# Patient Record
Sex: FEMALE | Race: WHITE | URBAN - METROPOLITAN AREA
[De-identification: names, ages, dates, MRNs, and addresses within clinical notes are randomized per-mention and may not be internally consistent; named-entity substitution may affect disease eponyms.]

---

## 2018-06-05 ENCOUNTER — OUTPATIENT (OUTPATIENT)
Dept: INPATIENT UNIT | Facility: HOSPITAL | Age: 38
LOS: 1 days | Discharge: HOME | End: 2018-06-05
Payer: COMMERCIAL

## 2018-06-05 PROCEDURE — 59025 FETAL NON-STRESS TEST: CPT | Mod: 26

## 2018-06-11 DIAGNOSIS — O47.9 FALSE LABOR, UNSPECIFIED: ICD-10-CM

## 2020-05-14 ENCOUNTER — INPATIENT (INPATIENT)
Facility: HOSPITAL | Age: 40
LOS: 3 days | Discharge: HOME | DRG: 897 | End: 2020-05-18
Attending: FAMILY MEDICINE | Admitting: INTERNAL MEDICINE
Payer: COMMERCIAL

## 2020-05-14 VITALS
HEART RATE: 84 BPM | TEMPERATURE: 99 F | WEIGHT: 139.99 LBS | SYSTOLIC BLOOD PRESSURE: 146 MMHG | RESPIRATION RATE: 19 BRPM | OXYGEN SATURATION: 95 % | HEIGHT: 65 IN | DIASTOLIC BLOOD PRESSURE: 93 MMHG

## 2020-05-14 DIAGNOSIS — Z98.891 HISTORY OF UTERINE SCAR FROM PREVIOUS SURGERY: Chronic | ICD-10-CM

## 2020-05-14 DIAGNOSIS — F10.239 ALCOHOL DEPENDENCE WITH WITHDRAWAL, UNSPECIFIED: ICD-10-CM

## 2020-05-14 LAB
ALBUMIN SERPL ELPH-MCNC: 4.4 G/DL — SIGNIFICANT CHANGE UP (ref 3.3–5)
ALP SERPL-CCNC: 52 U/L — SIGNIFICANT CHANGE UP (ref 40–120)
ALT FLD-CCNC: 227 U/L — HIGH (ref 12–78)
ANION GAP SERPL CALC-SCNC: 10 MMOL/L — SIGNIFICANT CHANGE UP (ref 5–17)
AST SERPL-CCNC: 252 U/L — HIGH (ref 15–37)
BILIRUB SERPL-MCNC: 0.8 MG/DL — SIGNIFICANT CHANGE UP (ref 0.2–1.2)
BUN SERPL-MCNC: 12 MG/DL — SIGNIFICANT CHANGE UP (ref 7–23)
CALCIUM SERPL-MCNC: 8.8 MG/DL — SIGNIFICANT CHANGE UP (ref 8.5–10.1)
CHLORIDE SERPL-SCNC: 103 MMOL/L — SIGNIFICANT CHANGE UP (ref 96–108)
CO2 SERPL-SCNC: 24 MMOL/L — SIGNIFICANT CHANGE UP (ref 22–31)
CREAT SERPL-MCNC: 0.73 MG/DL — SIGNIFICANT CHANGE UP (ref 0.5–1.3)
ETHANOL SERPL-MCNC: <10 MG/DL — SIGNIFICANT CHANGE UP (ref 0–10)
GLUCOSE SERPL-MCNC: 162 MG/DL — HIGH (ref 70–99)
HCT VFR BLD CALC: 36.4 % — SIGNIFICANT CHANGE UP (ref 34.5–45)
HGB BLD-MCNC: 12.6 G/DL — SIGNIFICANT CHANGE UP (ref 11.5–15.5)
MAGNESIUM SERPL-MCNC: 1.7 MG/DL — SIGNIFICANT CHANGE UP (ref 1.6–2.6)
MCHC RBC-ENTMCNC: 34.6 GM/DL — SIGNIFICANT CHANGE UP (ref 32–36)
MCHC RBC-ENTMCNC: 34.8 PG — HIGH (ref 27–34)
MCV RBC AUTO: 100.6 FL — HIGH (ref 80–100)
PLATELET # BLD AUTO: 99 K/UL — LOW (ref 150–400)
POTASSIUM SERPL-MCNC: 3.9 MMOL/L — SIGNIFICANT CHANGE UP (ref 3.5–5.3)
POTASSIUM SERPL-SCNC: 3.9 MMOL/L — SIGNIFICANT CHANGE UP (ref 3.5–5.3)
PROT SERPL-MCNC: 7.8 GM/DL — SIGNIFICANT CHANGE UP (ref 6–8.3)
RBC # BLD: 3.62 M/UL — LOW (ref 3.8–5.2)
RBC # FLD: 12.4 % — SIGNIFICANT CHANGE UP (ref 10.3–14.5)
SARS-COV-2 RNA SPEC QL NAA+PROBE: SIGNIFICANT CHANGE UP
SODIUM SERPL-SCNC: 137 MMOL/L — SIGNIFICANT CHANGE UP (ref 135–145)
TROPONIN I SERPL-MCNC: <0.015 NG/ML — SIGNIFICANT CHANGE UP (ref 0.01–0.04)
TROPONIN I SERPL-MCNC: <0.015 NG/ML — SIGNIFICANT CHANGE UP (ref 0.01–0.04)
WBC # BLD: 3.83 K/UL — SIGNIFICANT CHANGE UP (ref 3.8–10.5)
WBC # FLD AUTO: 3.83 K/UL — SIGNIFICANT CHANGE UP (ref 3.8–10.5)

## 2020-05-14 PROCEDURE — 70450 CT HEAD/BRAIN W/O DYE: CPT | Mod: 26

## 2020-05-14 PROCEDURE — 83735 ASSAY OF MAGNESIUM: CPT

## 2020-05-14 PROCEDURE — 71045 X-RAY EXAM CHEST 1 VIEW: CPT | Mod: 26

## 2020-05-14 PROCEDURE — 85027 COMPLETE CBC AUTOMATED: CPT

## 2020-05-14 PROCEDURE — 85025 COMPLETE CBC W/AUTO DIFF WBC: CPT

## 2020-05-14 PROCEDURE — 80076 HEPATIC FUNCTION PANEL: CPT

## 2020-05-14 PROCEDURE — 93010 ELECTROCARDIOGRAM REPORT: CPT

## 2020-05-14 PROCEDURE — 87635 SARS-COV-2 COVID-19 AMP PRB: CPT

## 2020-05-14 PROCEDURE — 84100 ASSAY OF PHOSPHORUS: CPT

## 2020-05-14 PROCEDURE — 99223 1ST HOSP IP/OBS HIGH 75: CPT

## 2020-05-14 PROCEDURE — 80048 BASIC METABOLIC PNL TOTAL CA: CPT

## 2020-05-14 PROCEDURE — 36415 COLL VENOUS BLD VENIPUNCTURE: CPT

## 2020-05-14 RX ORDER — FOLIC ACID 0.8 MG
1 TABLET ORAL DAILY
Refills: 0 | Status: DISCONTINUED | OUTPATIENT
Start: 2020-05-14 | End: 2020-05-18

## 2020-05-14 RX ORDER — SODIUM CHLORIDE 9 MG/ML
1000 INJECTION, SOLUTION INTRAVENOUS
Refills: 0 | Status: DISCONTINUED | OUTPATIENT
Start: 2020-05-14 | End: 2020-05-17

## 2020-05-14 RX ORDER — SODIUM CHLORIDE 9 MG/ML
1000 INJECTION INTRAMUSCULAR; INTRAVENOUS; SUBCUTANEOUS ONCE
Refills: 0 | Status: COMPLETED | OUTPATIENT
Start: 2020-05-14 | End: 2020-05-14

## 2020-05-14 RX ORDER — THIAMINE MONONITRATE (VIT B1) 100 MG
100 TABLET ORAL DAILY
Refills: 0 | Status: COMPLETED | OUTPATIENT
Start: 2020-05-14 | End: 2020-05-17

## 2020-05-14 RX ORDER — FLUOXETINE HCL 10 MG
40 CAPSULE ORAL DAILY
Refills: 0 | Status: DISCONTINUED | OUTPATIENT
Start: 2020-05-14 | End: 2020-05-18

## 2020-05-14 RX ADMIN — SODIUM CHLORIDE 1000 MILLILITER(S): 9 INJECTION INTRAMUSCULAR; INTRAVENOUS; SUBCUTANEOUS at 12:32

## 2020-05-14 RX ADMIN — Medication 2 MILLIGRAM(S): at 19:58

## 2020-05-14 RX ADMIN — Medication 2 MILLIGRAM(S): at 22:13

## 2020-05-14 RX ADMIN — Medication 2 MILLIGRAM(S): at 13:19

## 2020-05-14 RX ADMIN — SODIUM CHLORIDE 1000 MILLILITER(S): 9 INJECTION INTRAMUSCULAR; INTRAVENOUS; SUBCUTANEOUS at 13:32

## 2020-05-14 NOTE — ED PROVIDER NOTE - CLINICAL SUMMARY MEDICAL DECISION MAKING FREE TEXT BOX
Possible alcohol withdrawal seizures vs. seizures from other causes. Patient admitted for further inpatient evaluation.

## 2020-05-14 NOTE — H&P ADULT - NSHPPHYSICALEXAM_GEN_ALL_CORE
Vital Signs Last 24 Hrs  T(C): 37.5 (14 May 2020 17:51), Max: 37.5 (14 May 2020 17:51)  T(F): 99.5 (14 May 2020 17:51), Max: 99.5 (14 May 2020 17:51)  HR: 89 (14 May 2020 17:51) (81 - 89)  BP: 126/85 (14 May 2020 17:51) (126/85 - 146/93)  RR: 18 (14 May 2020 17:51) (18 - 19)  SpO2: 98% (14 May 2020 17:51) (95% - 98%)

## 2020-05-14 NOTE — ED PROVIDER NOTE - OBJECTIVE STATEMENT
40 year old female with PMH of depression, anxiety, alcohol use, presents to the ED, for a witnessed seizure, with loc, lasting a few minutes, witnessed by  who contacted EMT, likely post ictal at the time of the presentation, at the time of evaluation in the ED, sober, back to baseline, no focal neurological complaints, no visual complaints, tolerating PO, no cp, sob or palpitation, no abdominal pain. No epistaxis. No melena or hematochezia. Prior to seizure did not consume any hard drugs. States she has been drinking a bit more due to the news and the pandemic and that she recently slowed or stopped a day or so prior to the ED evaluation. Patient otherwise has no complaints, including no thoracic, abdominal, hip or limb complaints. No recent trauma.

## 2020-05-14 NOTE — H&P ADULT - HISTORY OF PRESENT ILLNESS
39 y/o F PMHx significant for anxiety, and depression presents to  for further evaluation and management of witnessed seizure-like activity at home. The patient denies any prior history of seizures, denies any recent sick contacts, subjective fevers, headaches, weakness, or paresthesias. The episode was witnessed by her  at home who notified EMS. The patient was likely postictal upon arrival of EMS and was noted to have bit her tongue during the episode. BGM as per EMS 137mg/dL. The patient does admit to consuming increasingly more alcohol (up uo 1-2 bottles of wine/day at times) although she states that her last drink was at 11pm on 5/13. She admits to having increased consumption as she has spent more time at home "during these times." Labs => MCV/MCH => 100.6/34.8, PLT 99, Glu 162, AST/ALT, 252/227, TnI (-) x 2, Glu 183, EtOH (-), COVID-19 (-). CT Head => Unremarkable noncontrast head CT. CXR => Negative Portable Chest. In the ED the patient was given Lorazepam 2mg IVP x 1, and NS x 1.

## 2020-05-14 NOTE — ED PROVIDER NOTE - CARE PLAN
Principal Discharge DX:	Alcohol withdrawal syndrome with complication  Secondary Diagnosis:	Seizures

## 2020-05-14 NOTE — H&P ADULT - ASSESSMENT
39 y/o F PMHx significant for anxiety, and depression presents to  for further evaluation and management of witnessed seizure-like activity at home. The patient denies any prior history of seizures, denies any recent sick contacts, subjective fevers, headaches, weakness, or paresthesias. The episode was witnessed by her  at home who notified EMS. The patient was likely postictal upon arrival of EMS and was noted to have bit her tongue during the episode. BGM as per EMS 137mg/dL. The patient does admit to consuming increasingly more alcohol (up uo 1-2 bottles of wine/day at times) although she states that her last drink was at 11pm on 5/13. She admits to having increased consumption as she has spent more time at home "during these times." Labs => MCV/MCH => 100.6/34.8, PLT 99, Glu 162, AST/ALT, 252/227, TnI (-) x 2, Glu 183, EtOH (-), COVID-19 (-). CT Head => Unremarkable noncontrast head CT. CXR => Negative Portable Chest. In the ED the patient was given Lorazepam 2mg IVP x 1, and NS x 1.    #Acute Alcohol Withdrawal Seizures  ~admit to Medicine  ~cont. CIWA protocol  ~seizure precautions  ~fall precautions  ~bed alarm  ~f/u Psychiatry  ~f/u w/ SW in the am    #Anxiety/Depression  ~cont. Fluoxetine 40mg po daily  ~takes Lorazepam as per medication reconciliation    #Vte ppx  ~IMPROVE VTE Risk Score is 0  [  ] Previous VTE                                                  3  [  ] Thrombophilia                                               2  [  ] Lower limb paralysis                                      2        (unable to hold up >15 seconds)    [  ] Current Cancer                                              2         (within 6 months)  [  ] Immobilization > 24 hrs                                1  [  ] ICU/CCU stay > 24 hours                              1  [  ] Age > 60                                                      1  ~cont. SCDs for now

## 2020-05-14 NOTE — ED ADULT TRIAGE NOTE - CHIEF COMPLAINT QUOTE
Patient comes in s/p seizure. Patient has no hx of seizures. Seizure was witnessed by . Patient was post ictal on EMS arrival. BGM as per . Patient admits to drinking 1-2 bottles of wine. Patient with c/o biting her tongue and anxiety.

## 2020-05-15 DIAGNOSIS — F41.9 ANXIETY DISORDER, UNSPECIFIED: ICD-10-CM

## 2020-05-15 DIAGNOSIS — F10.10 ALCOHOL ABUSE, UNCOMPLICATED: ICD-10-CM

## 2020-05-15 LAB
ANION GAP SERPL CALC-SCNC: 10 MMOL/L — SIGNIFICANT CHANGE UP (ref 5–17)
BASOPHILS # BLD AUTO: 0.02 K/UL — SIGNIFICANT CHANGE UP (ref 0–0.2)
BASOPHILS NFR BLD AUTO: 0.6 % — SIGNIFICANT CHANGE UP (ref 0–2)
BUN SERPL-MCNC: 8 MG/DL — SIGNIFICANT CHANGE UP (ref 7–23)
CALCIUM SERPL-MCNC: 8.1 MG/DL — LOW (ref 8.5–10.1)
CHLORIDE SERPL-SCNC: 105 MMOL/L — SIGNIFICANT CHANGE UP (ref 96–108)
CO2 SERPL-SCNC: 24 MMOL/L — SIGNIFICANT CHANGE UP (ref 22–31)
CREAT SERPL-MCNC: 0.44 MG/DL — LOW (ref 0.5–1.3)
EOSINOPHIL # BLD AUTO: 0.08 K/UL — SIGNIFICANT CHANGE UP (ref 0–0.5)
EOSINOPHIL NFR BLD AUTO: 2.3 % — SIGNIFICANT CHANGE UP (ref 0–6)
GLUCOSE SERPL-MCNC: 63 MG/DL — LOW (ref 70–99)
HCT VFR BLD CALC: 33.7 % — LOW (ref 34.5–45)
HGB BLD-MCNC: 11.3 G/DL — LOW (ref 11.5–15.5)
IMM GRANULOCYTES NFR BLD AUTO: 0.3 % — SIGNIFICANT CHANGE UP (ref 0–1.5)
LYMPHOCYTES # BLD AUTO: 0.93 K/UL — LOW (ref 1–3.3)
LYMPHOCYTES # BLD AUTO: 26.2 % — SIGNIFICANT CHANGE UP (ref 13–44)
MAGNESIUM SERPL-MCNC: 2.1 MG/DL — SIGNIFICANT CHANGE UP (ref 1.6–2.6)
MCHC RBC-ENTMCNC: 33.5 GM/DL — SIGNIFICANT CHANGE UP (ref 32–36)
MCHC RBC-ENTMCNC: 34 PG — SIGNIFICANT CHANGE UP (ref 27–34)
MCV RBC AUTO: 101.5 FL — HIGH (ref 80–100)
MONOCYTES # BLD AUTO: 0.33 K/UL — SIGNIFICANT CHANGE UP (ref 0–0.9)
MONOCYTES NFR BLD AUTO: 9.3 % — SIGNIFICANT CHANGE UP (ref 2–14)
NEUTROPHILS # BLD AUTO: 2.18 K/UL — SIGNIFICANT CHANGE UP (ref 1.8–7.4)
NEUTROPHILS NFR BLD AUTO: 61.3 % — SIGNIFICANT CHANGE UP (ref 43–77)
PHOSPHATE SERPL-MCNC: 2.5 MG/DL — SIGNIFICANT CHANGE UP (ref 2.5–4.5)
PLATELET # BLD AUTO: 84 K/UL — LOW (ref 150–400)
POTASSIUM SERPL-MCNC: 3.2 MMOL/L — LOW (ref 3.5–5.3)
POTASSIUM SERPL-SCNC: 3.2 MMOL/L — LOW (ref 3.5–5.3)
RBC # BLD: 3.32 M/UL — LOW (ref 3.8–5.2)
RBC # FLD: 12.3 % — SIGNIFICANT CHANGE UP (ref 10.3–14.5)
SODIUM SERPL-SCNC: 139 MMOL/L — SIGNIFICANT CHANGE UP (ref 135–145)
WBC # BLD: 3.55 K/UL — LOW (ref 3.8–10.5)
WBC # FLD AUTO: 3.55 K/UL — LOW (ref 3.8–10.5)

## 2020-05-15 PROCEDURE — 99232 SBSQ HOSP IP/OBS MODERATE 35: CPT

## 2020-05-15 PROCEDURE — 99233 SBSQ HOSP IP/OBS HIGH 50: CPT

## 2020-05-15 RX ORDER — POTASSIUM CHLORIDE 20 MEQ
40 PACKET (EA) ORAL EVERY 4 HOURS
Refills: 0 | Status: COMPLETED | OUTPATIENT
Start: 2020-05-15 | End: 2020-05-15

## 2020-05-15 RX ADMIN — Medication 2 MILLIGRAM(S): at 02:14

## 2020-05-15 RX ADMIN — SODIUM CHLORIDE 100 MILLILITER(S): 9 INJECTION, SOLUTION INTRAVENOUS at 00:18

## 2020-05-15 RX ADMIN — Medication 2 MILLIGRAM(S): at 14:17

## 2020-05-15 RX ADMIN — Medication 2 MILLIGRAM(S): at 06:02

## 2020-05-15 RX ADMIN — Medication 40 MILLIEQUIVALENT(S): at 14:16

## 2020-05-15 RX ADMIN — Medication 1.5 MILLIGRAM(S): at 22:29

## 2020-05-15 RX ADMIN — Medication 40 MILLIEQUIVALENT(S): at 10:06

## 2020-05-15 RX ADMIN — Medication 40 MILLIGRAM(S): at 10:06

## 2020-05-15 RX ADMIN — SODIUM CHLORIDE 100 MILLILITER(S): 9 INJECTION, SOLUTION INTRAVENOUS at 22:39

## 2020-05-15 RX ADMIN — Medication 1.5 MILLIGRAM(S): at 18:13

## 2020-05-15 RX ADMIN — Medication 1 MILLIGRAM(S): at 10:06

## 2020-05-15 RX ADMIN — Medication 100 MILLIGRAM(S): at 10:06

## 2020-05-15 RX ADMIN — Medication 1 TABLET(S): at 10:05

## 2020-05-15 RX ADMIN — Medication 2 MILLIGRAM(S): at 10:06

## 2020-05-15 NOTE — PROGRESS NOTE ADULT - SUBJECTIVE AND OBJECTIVE BOX
HOSPITALIST PROGRESS NOTE:  SUBJECTIVE:  PCP:  Chief Complaint: Patient is a 40y old  Female who presents with a chief complaint of Seizures (14 May 2020 18:17)      HPI:  41 y/o F PMHx significant for anxiety, and depression presents to  for further evaluation and management of witnessed seizure-like activity at home. The patient denies any prior history of seizures, denies any recent sick contacts, subjective fevers, headaches, weakness, or paresthesias. The episode was witnessed by her  at home who notified EMS. The patient was likely postictal upon arrival of EMS and was noted to have bit her tongue during the episode. BGM as per EMS 137mg/dL. The patient does admit to consuming increasingly more alcohol (up uo 1-2 bottles of wine/day at times) although she states that her last drink was at 11pm on 5/13. She admits to having increased consumption as she has spent more time at home "during these times." Labs => MCV/MCH => 100.6/34.8, PLT 99, Glu 162, AST/ALT, 252/227, TnI (-) x 2, Glu 183, EtOH (-), COVID-19 (-). CT Head => Unremarkable noncontrast head CT. CXR => Negative Portable Chest. In the ED the patient was given Lorazepam 2mg IVP x 1, and NS x 1. (14 May 2020 18:17)    5/15: Above reviewed; patient has no complaints. no hx of seizures or alcohol related seizures       Allergies:  No Known Allergies    REVIEW OF SYSTEMS:  See HPI. All other review of systems is negative unless indicated above.     OBJECTIVE  Physical Exam:  Vital Signs Last 24 Hrs  T(C): 36.6 (15 May 2020 12:00), Max: 37.5 (14 May 2020 17:51)  T(F): 97.8 (15 May 2020 12:00), Max: 99.5 (14 May 2020 17:51)  HR: 64 (15 May 2020 12:00) (62 - 89)  BP: 137/81 (15 May 2020 12:00) (103/70 - 137/81)  BP(mean): --  RR: 16 (15 May 2020 12:00) (16 - 19)  SpO2: 99% (15 May 2020 12:00) (95% - 100%)      Constitutional: NAD, awake and alert, well-developed  Neurological: AAO x 3, no focal deficits  HEENT: PERRLA, EOMI, MMM  Neck: Soft and supple, No LAD, No JVD  Respiratory: Breath sounds are clear bilaterally, No wheezing, rales or rhonchi  Cardiovascular: S1 and S2, regular rate and rhythm; no Murmurs, gallops or rubs  Gastrointestinal: Bowel Sounds present, soft, nontender, nondistended, no guarding, no rebound tenderness  Back: No CVA tenderness   Extremities: No peripheral edema  Vascular: 2+ peripheral pulses  Musculoskeletal: 5/5 strength b/l upper and lower extremities  Skin: No rashes  Breast: Deferred  Rectal: Deferred    MEDICATIONS  (STANDING):  FLUoxetine 40 milliGRAM(s) Oral daily  folic acid 1 milliGRAM(s) Oral daily  LORazepam     Tablet   Oral   LORazepam     Tablet 2 milliGRAM(s) Oral every 4 hours  LORazepam     Tablet 1.5 milliGRAM(s) Oral every 4 hours  multivitamin 1 Tablet(s) Oral daily  potassium chloride    Tablet ER 40 milliEquivalent(s) Oral every 4 hours  sodium chloride 0.9% 1000 milliLiter(s) IV Continuous   thiamine 100 milliGRAM(s) Oral daily      LABS: All Labs Reviewed:                        11.3   3.55  )-----------( 84       ( 15 May 2020 07:28 )             33.7     05-15    139  |  105  |  8   ----------------------------<  63<L>  3.2<L>   |  24  |  0.44<L>    Ca    8.1<L>      15 May 2020 07:28  Phos  2.5     05-15  Mg     2.1     05-15    TPro  6.9  /  Alb  3.6  /  TBili  1.0  /  DBili  0.3<H>  /  AST  125<H>  /  ALT  152<H>  /  AlkPhos  46  05-15      CARDIAC MARKERS ( 14 May 2020 14:48 )  <0.015 ng/mL / x     / x     / x     / x      CARDIAC MARKERS ( 14 May 2020 12:24 )  <0.015 ng/mL / x     / x     / x     / x        RADIOLOGY/EKG:      < from: Xray Chest 1 View AP/PA. (05.14.20 @ 12:38) >    IMPRESSION: Negative Portable Chest.        DISCRETE X-RAY DATA:  Percent of LEFT lung opacification: Clear  Percent of RIGHT lung opacification: Clear  Change in lung opacification from most recent x-ray (<=3 days): No Prior  Change from prior dated 3 or more days (same admission): No Prior    < end of copied text >    < from: CT Head No Cont (05.14.20 @ 12:39) >    Impression:  Unremarkable noncontrast head CT.    < end of copied text >

## 2020-05-15 NOTE — BEHAVIORAL HEALTH ASSESSMENT NOTE - TREATMENT
Partially impaired: cannot see medication labels or newsprint, but can see obstacles in path, and the surrounding layout; can count fingers at arm's length/distance
Denies

## 2020-05-15 NOTE — BEHAVIORAL HEALTH ASSESSMENT NOTE - NSBHCHARTREVIEWLAB_PSY_A_CORE FT
11.3   3.55  )-----------( 84       ( 15 May 2020 07:28 )             33.7       05-15    139  |  105  |  8   ----------------------------<  63<L>  3.2<L>   |  24  |  0.44<L>    Ca    8.1<L>      15 May 2020 07:28  Phos  2.5     05-15  Mg     2.1     05-15    TPro  6.9  /  Alb  3.6  /  TBili  1.0  /  DBili  0.3<H>  /  AST  125<H>  /  ALT  152<H>  /  AlkPhos  46  05-15                      Lactate Trend      CARDIAC MARKERS ( 14 May 2020 14:48 )  <0.015 ng/mL / x     / x     / x     / x      CARDIAC MARKERS ( 14 May 2020 12:24 )  <0.015 ng/mL / x     / x     / x     / x            CAPILLARY BLOOD GLUCOSE      POCT Blood Glucose.: 183 mg/dL (14 May 2020 12:09)

## 2020-05-15 NOTE — BEHAVIORAL HEALTH ASSESSMENT NOTE - HPI (INCLUDE ILLNESS QUALITY, SEVERITY, DURATION, TIMING, CONTEXT, MODIFYING FACTORS, ASSOCIATED SIGNS AND SYMPTOMS)
40 year-old  female, with history depression / anxiety, alcohol abuse, presenting for ?alcohol withdrawal seizure. Patient presenting calm and cooperative; pleasant; appropriate; linear; organized. Patient reports moderate depressed mood but no helplessness or anhedonia or hopelessness or suicidal ideation. Denies prior suicidal ideation or suicide attempt. Reports moderate anxiety, with excessive worrying in the setting of pandemic. Denies manic / psychotic symptoms. No delusions elicited. Reports drinking about bottle of wine a few times weekly. Denies history of withdrawals or substance abuse treatment. Denies history of DTs. Denies other substance abuse. Reports positive therapeutic relationships and strong social supports. Reports being in treatment with out-patient psychiatrist. Reports last medication change being ~ 1 month ago: increased to Prozac 40 mg. Reports being stable however. Denies need for additional psychiatric services whilst in hospital. Reports future orientation, with motivation to continue outpatient treatment. Engaged in safety planning.

## 2020-05-15 NOTE — BEHAVIORAL HEALTH ASSESSMENT NOTE - NSBHCHARTREVIEWVS_PSY_A_CORE FT
Vital Signs Last 24 Hrs  T(C): 36.9 (15 May 2020 10:04), Max: 37.5 (14 May 2020 17:51)  T(F): 98.4 (15 May 2020 10:04), Max: 99.5 (14 May 2020 17:51)  HR: 79 (15 May 2020 10:04) (62 - 89)  BP: 123/78 (15 May 2020 10:04) (103/70 - 146/93)  BP(mean): --  RR: 16 (15 May 2020 10:04) (16 - 19)  SpO2: 99% (15 May 2020 10:04) (95% - 100%)

## 2020-05-15 NOTE — PROGRESS NOTE ADULT - ASSESSMENT
#Acute Alcohol Withdrawal Seizures  -CT Head NAD  -CXR clear   ~cont. CIWA protocol - currently on Ativan 1.5 ATC  ~seizure precautions  ~fall precautions  ~Psychiatry consult appreciated - Prozac 40 mg daily; PATIENT IS PSYCHIATRICALLY CLEARED  ~f/u w/ SW in the am    #Anxiety/Depression  ~cont. Fluoxetine 40mg po daily  ~takes Lorazepam as per medication reconciliation    #Vte ppx  ~cont. SCDs for now

## 2020-05-15 NOTE — BEHAVIORAL HEALTH ASSESSMENT NOTE - RISK ASSESSMENT
Low Acute Suicide Risk LOW RISK     ACUTE RISK FACTORS: moderate depressed mood and anxiety; alcohol abuse     CHRONIC RISK FACTORS: depression, anxiety, alcohol abuse     PROTECTIVE FACTORS: no suicidal ideation/intent/plan, no in-patient hospitalizations, future oriented, supportive family, engaged in safety planning.

## 2020-05-16 LAB
ANION GAP SERPL CALC-SCNC: 9 MMOL/L — SIGNIFICANT CHANGE UP (ref 5–17)
BUN SERPL-MCNC: 8 MG/DL — SIGNIFICANT CHANGE UP (ref 7–23)
CALCIUM SERPL-MCNC: 8.9 MG/DL — SIGNIFICANT CHANGE UP (ref 8.5–10.1)
CHLORIDE SERPL-SCNC: 105 MMOL/L — SIGNIFICANT CHANGE UP (ref 96–108)
CO2 SERPL-SCNC: 21 MMOL/L — LOW (ref 22–31)
CREAT SERPL-MCNC: 0.44 MG/DL — LOW (ref 0.5–1.3)
GLUCOSE SERPL-MCNC: 66 MG/DL — LOW (ref 70–99)
MAGNESIUM SERPL-MCNC: 2.2 MG/DL — SIGNIFICANT CHANGE UP (ref 1.6–2.6)
PHOSPHATE SERPL-MCNC: 2.6 MG/DL — SIGNIFICANT CHANGE UP (ref 2.5–4.5)
POTASSIUM SERPL-MCNC: 3.7 MMOL/L — SIGNIFICANT CHANGE UP (ref 3.5–5.3)
POTASSIUM SERPL-SCNC: 3.7 MMOL/L — SIGNIFICANT CHANGE UP (ref 3.5–5.3)
SODIUM SERPL-SCNC: 135 MMOL/L — SIGNIFICANT CHANGE UP (ref 135–145)

## 2020-05-16 PROCEDURE — 99232 SBSQ HOSP IP/OBS MODERATE 35: CPT

## 2020-05-16 RX ORDER — POTASSIUM CHLORIDE 20 MEQ
40 PACKET (EA) ORAL ONCE
Refills: 0 | Status: COMPLETED | OUTPATIENT
Start: 2020-05-16 | End: 2020-05-16

## 2020-05-16 RX ADMIN — Medication 1.5 MILLIGRAM(S): at 14:21

## 2020-05-16 RX ADMIN — Medication 1 TABLET(S): at 10:35

## 2020-05-16 RX ADMIN — Medication 1.5 MILLIGRAM(S): at 06:02

## 2020-05-16 RX ADMIN — Medication 40 MILLIEQUIVALENT(S): at 10:34

## 2020-05-16 RX ADMIN — SODIUM CHLORIDE 100 MILLILITER(S): 9 INJECTION, SOLUTION INTRAVENOUS at 19:39

## 2020-05-16 RX ADMIN — SODIUM CHLORIDE 100 MILLILITER(S): 9 INJECTION, SOLUTION INTRAVENOUS at 10:34

## 2020-05-16 RX ADMIN — Medication 1 MILLIGRAM(S): at 22:03

## 2020-05-16 RX ADMIN — Medication 40 MILLIGRAM(S): at 10:34

## 2020-05-16 RX ADMIN — Medication 1.5 MILLIGRAM(S): at 10:35

## 2020-05-16 RX ADMIN — Medication 100 MILLIGRAM(S): at 10:35

## 2020-05-16 RX ADMIN — Medication 1.5 MILLIGRAM(S): at 02:06

## 2020-05-16 RX ADMIN — Medication 1 MILLIGRAM(S): at 18:30

## 2020-05-16 RX ADMIN — Medication 1 MILLIGRAM(S): at 10:35

## 2020-05-16 NOTE — PROGRESS NOTE ADULT - SUBJECTIVE AND OBJECTIVE BOX
HOSPITALIST PROGRESS NOTE:  SUBJECTIVE:  PCP:  Chief Complaint: Patient is a 40y old  Female who presents with a chief complaint of Seizures (14 May 2020 18:17)      HPI:  41 y/o F PMHx significant for anxiety, and depression presents to  for further evaluation and management of witnessed seizure-like activity at home. The patient denies any prior history of seizures, denies any recent sick contacts, subjective fevers, headaches, weakness, or paresthesias. The episode was witnessed by her  at home who notified EMS. The patient was likely postictal upon arrival of EMS and was noted to have bit her tongue during the episode. BGM as per EMS 137mg/dL. The patient does admit to consuming increasingly more alcohol (up uo 1-2 bottles of wine/day at times) although she states that her last drink was at 11pm on 5/13. She admits to having increased consumption as she has spent more time at home "during these times." Labs => MCV/MCH => 100.6/34.8, PLT 99, Glu 162, AST/ALT, 252/227, TnI (-) x 2, Glu 183, EtOH (-), COVID-19 (-). CT Head => Unremarkable noncontrast head CT. CXR => Negative Portable Chest. In the ED the patient was given Lorazepam 2mg IVP x 1, and NS x 1. (14 May 2020 18:17)    5/15: Above reviewed; patient has no complaints. no hx of seizures or alcohol related seizures   5/16:  Patient wants to go; I explained to her that she cant leave shes on high dose Ativan and there is a high risk of seizures, withdrawal and death; Patient is AAOX3, she has capacity to make decisions; She still has mild tremors despite being on ativan 1.5 around the clock      Allergies:  No Known Allergies    REVIEW OF SYSTEMS:  See HPI. All other review of systems is negative unless indicated above.     OBJECTIVE  Physical Exam:  Vital Signs Last 24 Hrs  T(C): 36.6 (15 May 2020 12:00), Max: 37.5 (14 May 2020 17:51)  T(F): 97.8 (15 May 2020 12:00), Max: 99.5 (14 May 2020 17:51)  HR: 64 (15 May 2020 12:00) (62 - 89)  BP: 137/81 (15 May 2020 12:00) (103/70 - 137/81)  BP(mean): --  RR: 16 (15 May 2020 12:00) (16 - 19)  SpO2: 99% (15 May 2020 12:00) (95% - 100%)      Constitutional: NAD, awake and alert, well-developed  Neurological: AAO x 3, no focal deficits  HEENT: PERRLA, EOMI, MMM  Neck: Soft and supple, No LAD, No JVD  Respiratory: Breath sounds are clear bilaterally, No wheezing, rales or rhonchi  Cardiovascular: S1 and S2, regular rate and rhythm; no Murmurs, gallops or rubs  Gastrointestinal: Bowel Sounds present, soft, nontender, nondistended, no guarding, no rebound tenderness  Back: No CVA tenderness   Extremities: No peripheral edema +Mild tremors   Vascular: 2+ peripheral pulses  Musculoskeletal: 5/5 strength b/l upper and lower extremities  Skin: No rashes  Breast: Deferred  Rectal: Deferred    MEDICATIONS  (STANDING):  FLUoxetine 40 milliGRAM(s) Oral daily  folic acid 1 milliGRAM(s) Oral daily  LORazepam     Tablet   Oral   LORazepam     Tablet 2 milliGRAM(s) Oral every 4 hours  LORazepam     Tablet 1.5 milliGRAM(s) Oral every 4 hours  multivitamin 1 Tablet(s) Oral daily  potassium chloride    Tablet ER 40 milliEquivalent(s) Oral every 4 hours  sodium chloride 0.9% 1000 milliLiter(s) IV Continuous   thiamine 100 milliGRAM(s) Oral daily    Lab Results:  CBC  CBC Full  -  ( 15 May 2020 07:28 )  WBC Count : 3.55 K/uL  RBC Count : 3.32 M/uL  Hemoglobin : 11.3 g/dL  Hematocrit : 33.7 %  Platelet Count - Automated : 84 K/uL  Mean Cell Volume : 101.5 fl  Mean Cell Hemoglobin : 34.0 pg  Mean Cell Hemoglobin Concentration : 33.5 gm/dL  Auto Neutrophil # : 2.18 K/uL  Auto Lymphocyte # : 0.93 K/uL  Auto Monocyte # : 0.33 K/uL  Auto Eosinophil # : 0.08 K/uL  Auto Basophil # : 0.02 K/uL  Auto Neutrophil % : 61.3 %  Auto Lymphocyte % : 26.2 %  Auto Monocyte % : 9.3 %  Auto Eosinophil % : 2.3 %  Auto Basophil % : 0.6 %    .		Differential:	[] Automated		[] Manual  Chemistry                        11.3   3.55  )-----------( 84       ( 15 May 2020 07:28 )             33.7     05-16    135  |  105  |  8   ----------------------------<  66<L>  3.7   |  21<L>  |  0.44<L>    Ca    8.9      16 May 2020 07:26  Phos  2.6     05-16  Mg     2.2     05-16    TPro  6.9  /  Alb  3.6  /  TBili  1.0  /  DBili  0.3<H>  /  AST  125<H>  /  ALT  152<H>  /  AlkPhos  46  05-15    LIVER FUNCTIONS - ( 15 May 2020 07:28 )  Alb: 3.6 g/dL / Pro: 6.9 gm/dL / ALK PHOS: 46 U/L / ALT: 152 U/L / AST: 125 U/L / GGT: x             CARDIAC MARKERS ( 14 May 2020 14:48 )  <0.015 ng/mL / x     / x     / x     / x      CARDIAC MARKERS ( 14 May 2020 12:24 )  <0.015 ng/mL / x     / x     / x     / x        RADIOLOGY/EKG:      < from: Xray Chest 1 View AP/PA. (05.14.20 @ 12:38) >    IMPRESSION: Negative Portable Chest.        DISCRETE X-RAY DATA:  Percent of LEFT lung opacification: Clear  Percent of RIGHT lung opacification: Clear  Change in lung opacification from most recent x-ray (<=3 days): No Prior  Change from prior dated 3 or more days (same admission): No Prior    < end of copied text >    < from: CT Head No Cont (05.14.20 @ 12:39) >    Impression:  Unremarkable noncontrast head CT.    < end of copied text >

## 2020-05-16 NOTE — PROGRESS NOTE ADULT - ASSESSMENT
#Acute Alcohol Withdrawal Seizures  #transaminitis 2ndry to Alcohol Use   -CT Head NAD  -CXR clear   ~cont. CIWA protocol - currently on Ativan 1.5 ATC  ~seizure precautions  ~fall precautions  ~Psychiatry consult appreciated - Prozac 40 mg daily; PATIENT IS PSYCHIATRICALLY CLEARED  ~f/u w/ SW   -patient refusing alcohol rehab    #Anxiety/Depression  ~cont. Fluoxetine 40mg po daily  ~takes Lorazepam .5 QD PRN    #Vte ppx  ~cont. SCDs for now      #Dispo - Patient wants to go; I explained to her that she cant leave shes on high dose Ativan and there is a high risk of seizures, withdrawal and death; Patient is AAOX3, she has capacity to make decisions; She still has mild tremors despite being on ativan 1.5 around the clock; I advised if she wants to go home she will have to sign AMA paperwork #Acute Alcohol Withdrawal Seizures  #transaminitis 2ndry to Alcohol Use   -CT Head NAD  -CXR clear   ~cont. CIWA protocol - currently on Ativan 1.5 ATC  ~seizure precautions  ~fall precautions  ~Psychiatry consult appreciated - Prozac 40 mg daily; PATIENT IS PSYCHIATRICALLY CLEARED  ~f/u w/ SW   -patient refusing alcohol rehab    #Anxiety/Depression  ~cont. Fluoxetine 40mg po daily  ~takes Lorazepam .5 QD PRN    #Vte ppx  ~cont. SCDs for now      #Dispo - Patient wants to go; I explained to her that she cant leave shes on high dose Ativan and there is a high risk of seizures, withdrawal and death; Patient is AAOX3, she has capacity to make decisions; She still has mild tremors despite being on ativan 1.5 around the clock; I advised if she wants to go home she will have to sign AMA paperwork; She is also advised not to drive with risk of withdrawal seizures

## 2020-05-17 LAB
ANION GAP SERPL CALC-SCNC: 9 MMOL/L — SIGNIFICANT CHANGE UP (ref 5–17)
BUN SERPL-MCNC: 8 MG/DL — SIGNIFICANT CHANGE UP (ref 7–23)
CALCIUM SERPL-MCNC: 8.4 MG/DL — LOW (ref 8.5–10.1)
CHLORIDE SERPL-SCNC: 108 MMOL/L — SIGNIFICANT CHANGE UP (ref 96–108)
CO2 SERPL-SCNC: 20 MMOL/L — LOW (ref 22–31)
CREAT SERPL-MCNC: 0.44 MG/DL — LOW (ref 0.5–1.3)
GLUCOSE SERPL-MCNC: 74 MG/DL — SIGNIFICANT CHANGE UP (ref 70–99)
HCT VFR BLD CALC: 34.6 % — SIGNIFICANT CHANGE UP (ref 34.5–45)
HGB BLD-MCNC: 12 G/DL — SIGNIFICANT CHANGE UP (ref 11.5–15.5)
MCHC RBC-ENTMCNC: 34.7 GM/DL — SIGNIFICANT CHANGE UP (ref 32–36)
MCHC RBC-ENTMCNC: 35 PG — HIGH (ref 27–34)
MCV RBC AUTO: 100.9 FL — HIGH (ref 80–100)
PLATELET # BLD AUTO: 94 K/UL — LOW (ref 150–400)
POTASSIUM SERPL-MCNC: 3.6 MMOL/L — SIGNIFICANT CHANGE UP (ref 3.5–5.3)
POTASSIUM SERPL-SCNC: 3.6 MMOL/L — SIGNIFICANT CHANGE UP (ref 3.5–5.3)
RBC # BLD: 3.43 M/UL — LOW (ref 3.8–5.2)
RBC # FLD: 12.2 % — SIGNIFICANT CHANGE UP (ref 10.3–14.5)
SODIUM SERPL-SCNC: 137 MMOL/L — SIGNIFICANT CHANGE UP (ref 135–145)
WBC # BLD: 4.18 K/UL — SIGNIFICANT CHANGE UP (ref 3.8–10.5)
WBC # FLD AUTO: 4.18 K/UL — SIGNIFICANT CHANGE UP (ref 3.8–10.5)

## 2020-05-17 PROCEDURE — 99232 SBSQ HOSP IP/OBS MODERATE 35: CPT

## 2020-05-17 RX ORDER — POTASSIUM CHLORIDE 20 MEQ
40 PACKET (EA) ORAL ONCE
Refills: 0 | Status: COMPLETED | OUTPATIENT
Start: 2020-05-17 | End: 2020-05-17

## 2020-05-17 RX ORDER — THIAMINE MONONITRATE (VIT B1) 100 MG
100 TABLET ORAL DAILY
Refills: 0 | Status: DISCONTINUED | OUTPATIENT
Start: 2020-05-17 | End: 2020-05-18

## 2020-05-17 RX ADMIN — Medication 0.5 MILLIGRAM(S): at 10:51

## 2020-05-17 RX ADMIN — Medication 40 MILLIGRAM(S): at 10:51

## 2020-05-17 RX ADMIN — SODIUM CHLORIDE 100 MILLILITER(S): 9 INJECTION, SOLUTION INTRAVENOUS at 05:52

## 2020-05-17 RX ADMIN — Medication 1 TABLET(S): at 10:51

## 2020-05-17 RX ADMIN — Medication 0.5 MILLIGRAM(S): at 14:41

## 2020-05-17 RX ADMIN — Medication 40 MILLIEQUIVALENT(S): at 14:40

## 2020-05-17 RX ADMIN — Medication 0.5 MILLIGRAM(S): at 18:17

## 2020-05-17 RX ADMIN — Medication 1 MILLIGRAM(S): at 05:53

## 2020-05-17 RX ADMIN — Medication 100 MILLIGRAM(S): at 10:51

## 2020-05-17 RX ADMIN — Medication 1 MILLIGRAM(S): at 10:51

## 2020-05-17 RX ADMIN — Medication 1 MILLIGRAM(S): at 02:07

## 2020-05-17 RX ADMIN — Medication 0.5 MILLIGRAM(S): at 21:33

## 2020-05-17 NOTE — PROGRESS NOTE ADULT - SUBJECTIVE AND OBJECTIVE BOX
HOSPITALIST PROGRESS NOTE:  SUBJECTIVE:  PCP:  Chief Complaint: Patient is a 40y old  Female who presents with a chief complaint of Seizures (14 May 2020 18:17)      HPI:  41 y/o F PMHx significant for anxiety, and depression presents to  for further evaluation and management of witnessed seizure-like activity at home. The patient denies any prior history of seizures, denies any recent sick contacts, subjective fevers, headaches, weakness, or paresthesias. The episode was witnessed by her  at home who notified EMS. The patient was likely postictal upon arrival of EMS and was noted to have bit her tongue during the episode. BGM as per EMS 137mg/dL. The patient does admit to consuming increasingly more alcohol (up uo 1-2 bottles of wine/day at times) although she states that her last drink was at 11pm on 5/13. She admits to having increased consumption as she has spent more time at home "during these times." Labs => MCV/MCH => 100.6/34.8, PLT 99, Glu 162, AST/ALT, 252/227, TnI (-) x 2, Glu 183, EtOH (-), COVID-19 (-). CT Head => Unremarkable noncontrast head CT. CXR => Negative Portable Chest. In the ED the patient was given Lorazepam 2mg IVP x 1, and NS x 1. (14 May 2020 18:17)    5/15: Above reviewed; patient has no complaints. no hx of seizures or alcohol related seizures   5/16:  Patient wants to go; I explained to her that she cant leave shes on high dose Ativan and there is a high risk of seizures, withdrawal and death; Patient is AAOX3, she has capacity to make decisions; She still has mild tremors despite being on ativan 1.5 around the clock  5/17:  Again patient wants to go see her dad as hes having a cardiac cath jessica; i told her that she has every right to leave against medical advise;  She is high risk of withdrawaing and going back into seizures. NO other events       Allergies:  No Known Allergies    REVIEW OF SYSTEMS:  See HPI. All other review of systems is negative unless indicated above.     OBJECTIVE  Physical Exam:  Vital Signs Last 24 Hrs  T(C): 36.9 (17 May 2020 10:46), Max: 37.2 (17 May 2020 02:07)  T(F): 98.4 (17 May 2020 10:46), Max: 99 (17 May 2020 02:07)  HR: 56 (17 May 2020 10:46) (56 - 72)  BP: 126/90 (17 May 2020 10:46) (112/79 - 139/89)  BP(mean): --  RR: 18 (17 May 2020 10:46) (18 - 20)  SpO2: 100% (17 May 2020 10:46) (99% - 100%)    Constitutional: NAD, awake and alert, well-developed  Neurological: AAO x 3, no focal deficits  HEENT: PERRLA, EOMI, MMM  Neck: Soft and supple, No LAD, No JVD  Respiratory: Breath sounds are clear bilaterally, No wheezing, rales or rhonchi  Cardiovascular: S1 and S2, regular rate and rhythm; no Murmurs, gallops or rubs  Gastrointestinal: Bowel Sounds present, soft, nontender, nondistended, no guarding, no rebound tenderness  Back: No CVA tenderness   Extremities: No peripheral edema +Mild tremors   Vascular: 2+ peripheral pulses  Musculoskeletal: 5/5 strength b/l upper and lower extremities  Skin: No rashes  Breast: Deferred  Rectal: Deferred    MEDICATIONS  (STANDING):  FLUoxetine 40 milliGRAM(s) Oral daily  folic acid 1 milliGRAM(s) Oral daily  LORazepam     Tablet   Oral   LORazepam     Tablet 2 milliGRAM(s) Oral every 4 hours  LORazepam     Tablet 1.5 milliGRAM(s) Oral every 4 hours  multivitamin 1 Tablet(s) Oral daily  potassium chloride    Tablet ER 40 milliEquivalent(s) Oral every 4 hours  sodium chloride 0.9% 1000 milliLiter(s) IV Continuous   thiamine 100 milliGRAM(s) Oral daily    Lab Results:  CBC  CBC Full  -  ( 17 May 2020 06:26 )  WBC Count : 4.18 K/uL  RBC Count : 3.43 M/uL  Hemoglobin : 12.0 g/dL  Hematocrit : 34.6 %  Platelet Count - Automated : 94 K/uL  Mean Cell Volume : 100.9 fl  Mean Cell Hemoglobin : 35.0 pg  Mean Cell Hemoglobin Concentration : 34.7 gm/dL  Auto Neutrophil # : x  Auto Lymphocyte # : x  Auto Monocyte # : x  Auto Eosinophil # : x  Auto Basophil # : x  Auto Neutrophil % : x  Auto Lymphocyte % : x  Auto Monocyte % : x  Auto Eosinophil % : x  Auto Basophil % : x    .		Differential:	[] Automated		[] Manual  Chemistry                        12.0   4.18  )-----------( 94       ( 17 May 2020 06:26 )             34.6     05-17    137  |  108  |  8   ----------------------------<  74  3.6   |  20<L>  |  0.44<L>    Ca    8.4<L>      17 May 2020 06:26  Phos  2.6     05-16  Mg     2.2     05-16    CARDIAC MARKERS ( 14 May 2020 14:48 )  <0.015 ng/mL / x     / x     / x     / x      CARDIAC MARKERS ( 14 May 2020 12:24 )  <0.015 ng/mL / x     / x     / x     / x        RADIOLOGY/EKG:      < from: Xray Chest 1 View AP/PA. (05.14.20 @ 12:38) >    IMPRESSION: Negative Portable Chest.        DISCRETE X-RAY DATA:  Percent of LEFT lung opacification: Clear  Percent of RIGHT lung opacification: Clear  Change in lung opacification from most recent x-ray (<=3 days): No Prior  Change from prior dated 3 or more days (same admission): No Prior    < end of copied text >    < from: CT Head No Cont (05.14.20 @ 12:39) >    Impression:  Unremarkable noncontrast head CT.    < end of copied text >

## 2020-05-17 NOTE — PROGRESS NOTE ADULT - ASSESSMENT
#Acute Alcohol Withdrawal Seizures  #transaminitis and thrombocytopenia 2ndry to Alcohol Use   -CT Head NAD  -CXR clear   ~cont. CIWA protocol - currently on Ativan .5 ATC  ~seizure precautions  ~fall precautions  ~Psychiatry consult appreciated - Prozac 40 mg daily; PATIENT IS PSYCHIATRICALLY CLEARED  ~f/u w/ SW   -patient refusing alcohol rehab    #Anxiety/Depression  ~cont. Fluoxetine 40mg po daily  ~takes Lorazepam .5 QD PRN    #Vte ppx  ~cont. SCDs for now      #Dispo - Patient wants to go; I explained to her that she can be discharged on high dose Ativan and there is a high risk of seizures, withdrawal and death; Patient is AAOX3, she has capacity to make decisions; She still has mild tremors despite being on ativan  around the clock; I advised if she wants to go home she will have to sign AMA paperwork; She is also advised not to drive with risk of withdrawal seizures

## 2020-05-18 ENCOUNTER — TRANSCRIPTION ENCOUNTER (OUTPATIENT)
Age: 40
End: 2020-05-18

## 2020-05-18 VITALS
SYSTOLIC BLOOD PRESSURE: 115 MMHG | RESPIRATION RATE: 18 BRPM | OXYGEN SATURATION: 100 % | HEART RATE: 77 BPM | DIASTOLIC BLOOD PRESSURE: 82 MMHG | TEMPERATURE: 98 F

## 2020-05-18 PROCEDURE — 99239 HOSP IP/OBS DSCHRG MGMT >30: CPT

## 2020-05-18 RX ORDER — THIAMINE MONONITRATE (VIT B1) 100 MG
1 TABLET ORAL
Qty: 30 | Refills: 0
Start: 2020-05-18 | End: 2020-06-16

## 2020-05-18 RX ORDER — FOLIC ACID 0.8 MG
1 TABLET ORAL
Qty: 30 | Refills: 0
Start: 2020-05-18 | End: 2020-06-16

## 2020-05-18 RX ADMIN — Medication 1 TABLET(S): at 10:23

## 2020-05-18 RX ADMIN — Medication 1 MILLIGRAM(S): at 10:23

## 2020-05-18 RX ADMIN — Medication 100 MILLIGRAM(S): at 10:23

## 2020-05-18 RX ADMIN — Medication 0.5 MILLIGRAM(S): at 01:55

## 2020-05-18 RX ADMIN — Medication 0.5 MILLIGRAM(S): at 06:07

## 2020-05-18 RX ADMIN — Medication 40 MILLIGRAM(S): at 10:23

## 2020-05-18 NOTE — DISCHARGE NOTE PROVIDER - HOSPITAL COURSE
HOSPITALIST PROGRESS NOTE:    SUBJECTIVE:    PCP:    Chief Complaint: Patient is a 40y old  Female who presents with a chief complaint of Seizures (14 May 2020 18:17)            HPI:    39 y/o F PMHx significant for anxiety, and depression presents to  for further evaluation and management of witnessed seizure-like activity at home. The patient denies any prior history of seizures, denies any recent sick contacts, subjective fevers, headaches, weakness, or paresthesias. The episode was witnessed by her  at home who notified EMS. The patient was likely postictal upon arrival of EMS and was noted to have bit her tongue during the episode. BGM as per EMS 137mg/dL. The patient does admit to consuming increasingly more alcohol (up uo 1-2 bottles of wine/day at times) although she states that her last drink was at 11pm on 5/13. She admits to having increased consumption as she has spent more time at home "during these times." Labs => MCV/MCH => 100.6/34.8, PLT 99, Glu 162, AST/ALT, 252/227, TnI (-) x 2, Glu 183, EtOH (-), COVID-19 (-). CT Head => Unremarkable noncontrast head CT. CXR => Negative Portable Chest. In the ED the patient was given Lorazepam 2mg IVP x 1, and NS x 1. (14 May 2020 18:17)        5/15: Above reviewed; patient has no complaints. no hx of seizures or alcohol related seizures     5/16:  Patient wants to go; I explained to her that she cant leave shes on high dose Ativan and there is a high risk of seizures, withdrawal and death; Patient is AAOX3, she has capacity to make decisions; She still has mild tremors despite being on ativan 1.5 around the clock    5/17:  Again patient wants to go see her dad as hes having a cardiac cath jessica; i told her that she has every right to leave against medical advise;  She is high risk of withdrawaing and going back into seizures. NO other events    5/18:  Patient has no complaints.  NO signs of withdrawal; will monitor off of ativan and if no signs of withdrawal patient will be going home             #Acute Alcohol Withdrawal Seizures    #transaminitis and thrombocytopenia 2ndry to Alcohol Use     -CT Head NAD    -CXR clear     ~cont. CIWA protocol - monitor off of Ativan    ~seizure precautions    ~fall precautions    ~Psychiatry consult appreciated - Prozac 40 mg daily; PATIENT IS PSYCHIATRICALLY CLEARED    ~f/u w/ SW     -patient refusing alcohol rehab        #Anxiety/Depression    ~cont. Fluoxetine 40mg po daily    ~takes Lorazepam .5 QD PRN        #Vte ppx    ~cont. SCDs for now          #Dispo -Keep off ativan today and if no signs of withdrawal patient can go home today; D/C paperwork and med rec done         total time 60 minutes

## 2020-05-18 NOTE — DISCHARGE NOTE NURSING/CASE MANAGEMENT/SOCIAL WORK - PATIENT PORTAL LINK FT
You can access the FollowMyHealth Patient Portal offered by [facility] by registering at the following website: [email or web address]. By joining [email or web address]’s FollowMyHealth portal, you will also be able to view your health information using other applications (apps) compatible with our system.

## 2020-05-18 NOTE — PROGRESS NOTE ADULT - SUBJECTIVE AND OBJECTIVE BOX
HOSPITALIST PROGRESS NOTE:  SUBJECTIVE:  PCP:  Chief Complaint: Patient is a 40y old  Female who presents with a chief complaint of Seizures (14 May 2020 18:17)      HPI:  39 y/o F PMHx significant for anxiety, and depression presents to  for further evaluation and management of witnessed seizure-like activity at home. The patient denies any prior history of seizures, denies any recent sick contacts, subjective fevers, headaches, weakness, or paresthesias. The episode was witnessed by her  at home who notified EMS. The patient was likely postictal upon arrival of EMS and was noted to have bit her tongue during the episode. BGM as per EMS 137mg/dL. The patient does admit to consuming increasingly more alcohol (up uo 1-2 bottles of wine/day at times) although she states that her last drink was at 11pm on 5/13. She admits to having increased consumption as she has spent more time at home "during these times." Labs => MCV/MCH => 100.6/34.8, PLT 99, Glu 162, AST/ALT, 252/227, TnI (-) x 2, Glu 183, EtOH (-), COVID-19 (-). CT Head => Unremarkable noncontrast head CT. CXR => Negative Portable Chest. In the ED the patient was given Lorazepam 2mg IVP x 1, and NS x 1. (14 May 2020 18:17)    5/15: Above reviewed; patient has no complaints. no hx of seizures or alcohol related seizures   5/16:  Patient wants to go; I explained to her that she cant leave shes on high dose Ativan and there is a high risk of seizures, withdrawal and death; Patient is AAOX3, she has capacity to make decisions; She still has mild tremors despite being on ativan 1.5 around the clock  5/17:  Again patient wants to go see her dad as hes having a cardiac cath jessica; i told her that she has every right to leave against medical advise;  She is high risk of withdrawaing and going back into seizures. NO other events  5/18:  Patient has no complaints.  NO signs of withdrawal; will monitor off of ativan and if no signs of withdrawal patient will be going home       Allergies:  No Known Allergies    REVIEW OF SYSTEMS:  See HPI. All other review of systems is negative unless indicated above.     OBJECTIVE  Physical Exam:  Vital Signs Last 24 Hrs  T(C): 36.8 (18 May 2020 10:05), Max: 37.2 (18 May 2020 04:27)  T(F): 98.2 (18 May 2020 10:05), Max: 98.9 (18 May 2020 04:27)  HR: 70 (18 May 2020 10:05) (59 - 70)  BP: 120/84 (18 May 2020 10:05) (114/83 - 137/85)  BP(mean): --  RR: 16 (18 May 2020 10:05) (16 - 18)  SpO2: 100% (18 May 2020 10:05) (100% - 100%)    Constitutional: NAD, awake and alert, well-developed  Neurological: AAO x 3, no focal deficits  HEENT: PERRLA, EOMI, MMM  Neck: Soft and supple, No LAD, No JVD  Respiratory: Breath sounds are clear bilaterally, No wheezing, rales or rhonchi  Cardiovascular: S1 and S2, regular rate and rhythm; no Murmurs, gallops or rubs  Gastrointestinal: Bowel Sounds present, soft, nontender, nondistended, no guarding, no rebound tenderness  Back: No CVA tenderness   Extremities: No peripheral edema +Mild tremors   Vascular: 2+ peripheral pulses  Musculoskeletal: 5/5 strength b/l upper and lower extremities  Skin: No rashes  Breast: Deferred  Rectal: Deferred    MEDICATIONS  (STANDING):  FLUoxetine 40 milliGRAM(s) Oral daily  folic acid 1 milliGRAM(s) Oral daily  LORazepam     Tablet   Oral   LORazepam     Tablet 2 milliGRAM(s) Oral every 4 hours  LORazepam     Tablet 1.5 milliGRAM(s) Oral every 4 hours  multivitamin 1 Tablet(s) Oral daily  potassium chloride    Tablet ER 40 milliEquivalent(s) Oral every 4 hours  sodium chloride 0.9% 1000 milliLiter(s) IV Continuous   thiamine 100 milliGRAM(s) Oral daily    Lab Results:  CBC  CBC Full  -  ( 17 May 2020 06:26 )  WBC Count : 4.18 K/uL  RBC Count : 3.43 M/uL  Hemoglobin : 12.0 g/dL  Hematocrit : 34.6 %  Platelet Count - Automated : 94 K/uL  Mean Cell Volume : 100.9 fl  Mean Cell Hemoglobin : 35.0 pg  Mean Cell Hemoglobin Concentration : 34.7 gm/dL  Auto Neutrophil # : x  Auto Lymphocyte # : x  Auto Monocyte # : x  Auto Eosinophil # : x  Auto Basophil # : x  Auto Neutrophil % : x  Auto Lymphocyte % : x  Auto Monocyte % : x  Auto Eosinophil % : x  Auto Basophil % : x    .		Differential:	[] Automated		[] Manual  Chemistry                        12.0   4.18  )-----------( 94       ( 17 May 2020 06:26 )             34.6     05-17    137  |  108  |  8   ----------------------------<  74  3.6   |  20<L>  |  0.44<L>    Ca    8.4<L>      17 May 2020 06:26    CARDIAC MARKERS ( 14 May 2020 14:48 )  <0.015 ng/mL / x     / x     / x     / x      CARDIAC MARKERS ( 14 May 2020 12:24 )  <0.015 ng/mL / x     / x     / x     / x        RADIOLOGY/EKG:      < from: Xray Chest 1 View AP/PA. (05.14.20 @ 12:38) >    IMPRESSION: Negative Portable Chest.        DISCRETE X-RAY DATA:  Percent of LEFT lung opacification: Clear  Percent of RIGHT lung opacification: Clear  Change in lung opacification from most recent x-ray (<=3 days): No Prior  Change from prior dated 3 or more days (same admission): No Prior    < end of copied text >    < from: CT Head No Cont (05.14.20 @ 12:39) >    Impression:  Unremarkable noncontrast head CT.    < end of copied text >

## 2020-05-18 NOTE — DISCHARGE NOTE PROVIDER - NSDCMRMEDTOKEN_GEN_ALL_CORE_FT
FLUoxetine 40 mg oral capsule: 1 cap(s) orally once a day  folic acid 1 mg oral tablet: 1 tab(s) orally once a day  LORazepam 0.5 mg oral tablet: 1 tab(s) orally 2 times a day, As Needed  Multiple Vitamins oral tablet: 1 tab(s) orally once a day  thiamine 100 mg oral tablet: 1 tab(s) orally once a day

## 2020-05-18 NOTE — DISCHARGE NOTE PROVIDER - NSDCCPCAREPLAN_GEN_ALL_CORE_FT
PRINCIPAL DISCHARGE DIAGNOSIS  Diagnosis: Alcohol withdrawal syndrome with complication  Assessment and Plan of Treatment: #Acute Alcohol Withdrawal Seizures  #transaminitis and thrombocytopenia 2ndry to Alcohol Use   -CT Head NAD  -CXR clear   ~seizure precautions  -abstain from drinking   -stop driivng till cleared by ur PCP/Neuro  -FU plt and lfts as outpatient  -continue MVI, thiamine and Folic acid         SECONDARY DISCHARGE DIAGNOSES  Diagnosis: Seizures  Assessment and Plan of Treatment:

## 2020-05-18 NOTE — PROGRESS NOTE ADULT - ASSESSMENT
#Acute Alcohol Withdrawal Seizures  #transaminitis and thrombocytopenia 2ndry to Alcohol Use   -CT Head NAD  -CXR clear   ~cont. CIWA protocol - monitor off of Ativan  ~seizure precautions  ~fall precautions  ~Psychiatry consult appreciated - Prozac 40 mg daily; PATIENT IS PSYCHIATRICALLY CLEARED  ~f/u w/ SW   -patient refusing alcohol rehab    #Anxiety/Depression  ~cont. Fluoxetine 40mg po daily  ~takes Lorazepam .5 QD PRN    #Vte ppx  ~cont. SCDs for now      #Dispo -Keep off ativan today and if no signs of withdrawal patient can go home today; D/C paperwork and med rec done

## 2020-05-20 DIAGNOSIS — D69.59 OTHER SECONDARY THROMBOCYTOPENIA: ICD-10-CM

## 2020-05-20 DIAGNOSIS — F10.239 ALCOHOL DEPENDENCE WITH WITHDRAWAL, UNSPECIFIED: ICD-10-CM

## 2020-05-20 DIAGNOSIS — F32.9 MAJOR DEPRESSIVE DISORDER, SINGLE EPISODE, UNSPECIFIED: ICD-10-CM

## 2020-05-20 DIAGNOSIS — F41.9 ANXIETY DISORDER, UNSPECIFIED: ICD-10-CM

## 2020-05-20 DIAGNOSIS — R74.0 NONSPECIFIC ELEVATION OF LEVELS OF TRANSAMINASE AND LACTIC ACID DEHYDROGENASE [LDH]: ICD-10-CM

## 2023-05-03 ENCOUNTER — INPATIENT (INPATIENT)
Facility: HOSPITAL | Age: 43
LOS: 1 days | Discharge: LEFT BEFORE DISCHARGE | DRG: 894 | End: 2023-05-05
Attending: INTERNAL MEDICINE | Admitting: HOSPITALIST
Payer: COMMERCIAL

## 2023-05-03 VITALS
HEART RATE: 95 BPM | OXYGEN SATURATION: 99 % | TEMPERATURE: 98 F | DIASTOLIC BLOOD PRESSURE: 99 MMHG | SYSTOLIC BLOOD PRESSURE: 148 MMHG | RESPIRATION RATE: 16 BRPM

## 2023-05-03 DIAGNOSIS — F10.939 ALCOHOL USE, UNSPECIFIED WITH WITHDRAWAL, UNSPECIFIED: ICD-10-CM

## 2023-05-03 DIAGNOSIS — Z98.891 HISTORY OF UTERINE SCAR FROM PREVIOUS SURGERY: Chronic | ICD-10-CM

## 2023-05-03 LAB
ALBUMIN SERPL ELPH-MCNC: 3.9 G/DL — SIGNIFICANT CHANGE UP (ref 3.3–5)
ALP SERPL-CCNC: 62 U/L — SIGNIFICANT CHANGE UP (ref 40–120)
ALT FLD-CCNC: 188 U/L — HIGH (ref 12–78)
AMPHET UR-MCNC: NEGATIVE — SIGNIFICANT CHANGE UP
ANION GAP SERPL CALC-SCNC: 7 MMOL/L — SIGNIFICANT CHANGE UP (ref 5–17)
APPEARANCE UR: CLEAR — SIGNIFICANT CHANGE UP
APTT BLD: 27.2 SEC — LOW (ref 27.5–35.5)
AST SERPL-CCNC: 218 U/L — HIGH (ref 15–37)
BACTERIA # UR AUTO: ABNORMAL
BARBITURATES UR SCN-MCNC: NEGATIVE — SIGNIFICANT CHANGE UP
BENZODIAZ UR-MCNC: NEGATIVE — SIGNIFICANT CHANGE UP
BILIRUB SERPL-MCNC: 1.5 MG/DL — HIGH (ref 0.2–1.2)
BILIRUB UR-MCNC: NEGATIVE — SIGNIFICANT CHANGE UP
BUN SERPL-MCNC: 11 MG/DL — SIGNIFICANT CHANGE UP (ref 7–23)
CALCIUM SERPL-MCNC: 9.2 MG/DL — SIGNIFICANT CHANGE UP (ref 8.5–10.1)
CHLORIDE SERPL-SCNC: 100 MMOL/L — SIGNIFICANT CHANGE UP (ref 96–108)
CO2 SERPL-SCNC: 27 MMOL/L — SIGNIFICANT CHANGE UP (ref 22–31)
COCAINE METAB.OTHER UR-MCNC: NEGATIVE — SIGNIFICANT CHANGE UP
COLOR SPEC: ABNORMAL
COMMENT - URINE: SIGNIFICANT CHANGE UP
CREAT SERPL-MCNC: 0.45 MG/DL — LOW (ref 0.5–1.3)
DIFF PNL FLD: ABNORMAL
EGFR: 123 ML/MIN/1.73M2 — SIGNIFICANT CHANGE UP
EPI CELLS # UR: SIGNIFICANT CHANGE UP
GLUCOSE SERPL-MCNC: 87 MG/DL — SIGNIFICANT CHANGE UP (ref 70–99)
GLUCOSE UR QL: NEGATIVE — SIGNIFICANT CHANGE UP
HCG SERPL-ACNC: <1 MIU/ML — SIGNIFICANT CHANGE UP
HCT VFR BLD CALC: 34.1 % — LOW (ref 34.5–45)
HGB BLD-MCNC: 12.3 G/DL — SIGNIFICANT CHANGE UP (ref 11.5–15.5)
INR BLD: 1.14 RATIO — SIGNIFICANT CHANGE UP (ref 0.88–1.16)
KETONES UR-MCNC: ABNORMAL
LEUKOCYTE ESTERASE UR-ACNC: ABNORMAL
LIDOCAIN IGE QN: 139 U/L — SIGNIFICANT CHANGE UP (ref 73–393)
MAGNESIUM SERPL-MCNC: 1.5 MG/DL — LOW (ref 1.6–2.6)
MCHC RBC-ENTMCNC: 35.9 PG — HIGH (ref 27–34)
MCHC RBC-ENTMCNC: 36.1 GM/DL — HIGH (ref 32–36)
MCV RBC AUTO: 99.4 FL — SIGNIFICANT CHANGE UP (ref 80–100)
METHADONE UR-MCNC: NEGATIVE — SIGNIFICANT CHANGE UP
NITRITE UR-MCNC: NEGATIVE — SIGNIFICANT CHANGE UP
OPIATES UR-MCNC: NEGATIVE — SIGNIFICANT CHANGE UP
PCP SPEC-MCNC: SIGNIFICANT CHANGE UP
PCP UR-MCNC: NEGATIVE — SIGNIFICANT CHANGE UP
PH UR: 7 — SIGNIFICANT CHANGE UP (ref 5–8)
PLATELET # BLD AUTO: 136 K/UL — LOW (ref 150–400)
POTASSIUM SERPL-MCNC: 3.3 MMOL/L — LOW (ref 3.5–5.3)
POTASSIUM SERPL-SCNC: 3.3 MMOL/L — LOW (ref 3.5–5.3)
PROT SERPL-MCNC: 7.4 GM/DL — SIGNIFICANT CHANGE UP (ref 6–8.3)
PROT UR-MCNC: SIGNIFICANT CHANGE UP MG/DL
PROTHROM AB SERPL-ACNC: 13.2 SEC — SIGNIFICANT CHANGE UP (ref 10.5–13.4)
RBC # BLD: 3.43 M/UL — LOW (ref 3.8–5.2)
RBC # FLD: 11.7 % — SIGNIFICANT CHANGE UP (ref 10.3–14.5)
RBC CASTS # UR COMP ASSIST: ABNORMAL /HPF (ref 0–4)
SODIUM SERPL-SCNC: 134 MMOL/L — LOW (ref 135–145)
SP GR SPEC: 1.01 — SIGNIFICANT CHANGE UP (ref 1.01–1.02)
THC UR QL: NEGATIVE — SIGNIFICANT CHANGE UP
UROBILINOGEN FLD QL: 1
WBC # BLD: 4.55 K/UL — SIGNIFICANT CHANGE UP (ref 3.8–10.5)
WBC # FLD AUTO: 4.55 K/UL — SIGNIFICANT CHANGE UP (ref 3.8–10.5)
WBC UR QL: SIGNIFICANT CHANGE UP /HPF (ref 0–5)

## 2023-05-03 PROCEDURE — 80307 DRUG TEST PRSMV CHEM ANLYZR: CPT

## 2023-05-03 PROCEDURE — 95714 VEEG EA 12-26 HR UNMNTR: CPT

## 2023-05-03 PROCEDURE — 70450 CT HEAD/BRAIN W/O DYE: CPT | Mod: 26,MC

## 2023-05-03 PROCEDURE — 80048 BASIC METABOLIC PNL TOTAL CA: CPT

## 2023-05-03 PROCEDURE — 71045 X-RAY EXAM CHEST 1 VIEW: CPT | Mod: 26

## 2023-05-03 PROCEDURE — 95819 EEG AWAKE AND ASLEEP: CPT

## 2023-05-03 PROCEDURE — 93010 ELECTROCARDIOGRAM REPORT: CPT

## 2023-05-03 PROCEDURE — 36415 COLL VENOUS BLD VENIPUNCTURE: CPT

## 2023-05-03 PROCEDURE — 95700 EEG CONT REC W/VID EEG TECH: CPT

## 2023-05-03 PROCEDURE — 99285 EMERGENCY DEPT VISIT HI MDM: CPT

## 2023-05-03 RX ORDER — ONDANSETRON 8 MG/1
4 TABLET, FILM COATED ORAL ONCE
Refills: 0 | Status: COMPLETED | OUTPATIENT
Start: 2023-05-03 | End: 2023-05-03

## 2023-05-03 RX ORDER — FAMOTIDINE 10 MG/ML
20 INJECTION INTRAVENOUS ONCE
Refills: 0 | Status: COMPLETED | OUTPATIENT
Start: 2023-05-03 | End: 2023-05-03

## 2023-05-03 RX ORDER — SODIUM CHLORIDE 9 MG/ML
1000 INJECTION INTRAMUSCULAR; INTRAVENOUS; SUBCUTANEOUS ONCE
Refills: 0 | Status: COMPLETED | OUTPATIENT
Start: 2023-05-03 | End: 2023-05-03

## 2023-05-03 RX ORDER — SODIUM CHLORIDE 9 MG/ML
1000 INJECTION, SOLUTION INTRAVENOUS
Refills: 0 | Status: DISCONTINUED | OUTPATIENT
Start: 2023-05-03 | End: 2023-05-03

## 2023-05-03 RX ORDER — THIAMINE MONONITRATE (VIT B1) 100 MG
100 TABLET ORAL ONCE
Refills: 0 | Status: COMPLETED | OUTPATIENT
Start: 2023-05-03 | End: 2023-05-03

## 2023-05-03 RX ORDER — FOLIC ACID 0.8 MG
1 TABLET ORAL ONCE
Refills: 0 | Status: COMPLETED | OUTPATIENT
Start: 2023-05-03 | End: 2023-05-03

## 2023-05-03 RX ORDER — POTASSIUM CHLORIDE 20 MEQ
10 PACKET (EA) ORAL
Refills: 0 | Status: COMPLETED | OUTPATIENT
Start: 2023-05-03 | End: 2023-05-04

## 2023-05-03 RX ADMIN — Medication 1 MILLIGRAM(S): at 23:46

## 2023-05-03 RX ADMIN — SODIUM CHLORIDE 1000 MILLILITER(S): 9 INJECTION INTRAMUSCULAR; INTRAVENOUS; SUBCUTANEOUS at 23:50

## 2023-05-03 RX ADMIN — ONDANSETRON 4 MILLIGRAM(S): 8 TABLET, FILM COATED ORAL at 22:36

## 2023-05-03 RX ADMIN — Medication 100 MILLIGRAM(S): at 22:47

## 2023-05-03 RX ADMIN — SODIUM CHLORIDE 1000 MILLILITER(S): 9 INJECTION INTRAMUSCULAR; INTRAVENOUS; SUBCUTANEOUS at 22:37

## 2023-05-03 RX ADMIN — Medication 1 MILLIGRAM(S): at 22:37

## 2023-05-03 RX ADMIN — FAMOTIDINE 20 MILLIGRAM(S): 10 INJECTION INTRAVENOUS at 22:36

## 2023-05-03 RX ADMIN — Medication 100 MILLIEQUIVALENT(S): at 23:46
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
## 2023-05-03 NOTE — ED PROVIDER NOTE - BIRTH SEX
[FreeTextEntry1] : Patient is a 49 year male present in the office today in regards to his left foot pain. His current pain level is a 4/10. He states that his pain has improved 50% and swelling 75% compared to his last office visit. He is wearing sneakers. He notes that his pain is more in the soft tissue area.  He is not currently taking any pain medications at this moment. No other complaints at this time.\par \par  has sleep study in [place] upcoming. more episodes. 
Unknown

## 2023-05-03 NOTE — ED PROVIDER NOTE - CADM POA PRESS ULCER
Recent PHQ 2/9 Score    PHQ 2:  Date Adult PHQ 2 Score   4/27/2020 0       PHQ 9:       Health Maintenance Due   Topic Date Due   • Pneumococcal Vaccine 0-64 (1 of 3 - PCV13) 07/18/1972   • DTaP/Tdap/Td Vaccine (1 - Tdap) 07/18/1985   • Shingles Vaccine (1 of 2) 07/18/2016   • Colorectal Cancer Screen-  07/18/2016   • Influenza Vaccine (1) 09/01/2020   • Depression Screening  04/27/2021       Patient is due for topics as listed above but is not proceeding with Immunization(s) Dtap/Tdap/Td, Influenza, Pneumococcal and Shingles and Colorectal Cancer Screening: Colonoscopy at this time.            No

## 2023-05-03 NOTE — ED ADULT NURSE REASSESSMENT NOTE - NS ED NURSE REASSESS COMMENT FT1
Received patient at 1910 in ER bed #12 on cardiac monitor. Patient a & ox4, respirations even and unlabored on room air. Await MD [place].

## 2023-05-03 NOTE — ED PROVIDER NOTE - EYES, MLM
Clear bilaterally, pupils equal, round and reactive to light. No photophobia. Clear bilaterally, pupils equal, round and reactive to light. No photophobia.  EOMI.
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
## 2023-05-03 NOTE — ED PROVIDER NOTE - INPATIENT RECORD SUMMARY
May 2020 [facility] admission for EtOH withdrawal seizures, refused EtOH rehab on d/c.
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
## 2023-05-03 NOTE — ED PROVIDER NOTE - PROGRESS NOTE DETAILS
Elsa Mcallister for attending Dr. Oneal: Sister at bedside, concerned about possible EtOH abuse may have played into today's episode.

## 2023-05-04 PROBLEM — F41.9 ANXIETY DISORDER, UNSPECIFIED: Chronic | Status: ACTIVE | Noted: 2020-05-14

## 2023-05-04 PROBLEM — F32.9 MAJOR DEPRESSIVE DISORDER, SINGLE EPISODE, UNSPECIFIED: Chronic | Status: ACTIVE | Noted: 2020-05-14

## 2023-05-04 LAB
BASOPHILS # BLD AUTO: 0.04 K/UL — SIGNIFICANT CHANGE UP (ref 0–0.2)
BASOPHILS NFR BLD AUTO: 0.9 % — SIGNIFICANT CHANGE UP (ref 0–2)
EOSINOPHIL # BLD AUTO: 0.01 K/UL — SIGNIFICANT CHANGE UP (ref 0–0.5)
EOSINOPHIL NFR BLD AUTO: 0.2 % — SIGNIFICANT CHANGE UP (ref 0–6)
ETHANOL SERPL-MCNC: <10 MG/DL — SIGNIFICANT CHANGE UP (ref 0–10)
IMM GRANULOCYTES NFR BLD AUTO: 0.4 % — SIGNIFICANT CHANGE UP (ref 0–0.9)
LYMPHOCYTES # BLD AUTO: 0.4 K/UL — LOW (ref 1–3.3)
LYMPHOCYTES # BLD AUTO: 8.8 % — LOW (ref 13–44)
MANUAL SMEAR VERIFICATION: SIGNIFICANT CHANGE UP
MONOCYTES # BLD AUTO: 0.3 K/UL — SIGNIFICANT CHANGE UP (ref 0–0.9)
MONOCYTES NFR BLD AUTO: 6.6 % — SIGNIFICANT CHANGE UP (ref 2–14)
NEUTROPHILS # BLD AUTO: 3.78 K/UL — SIGNIFICANT CHANGE UP (ref 1.8–7.4)
NEUTROPHILS NFR BLD AUTO: 83.1 % — HIGH (ref 43–77)
PLAT MORPH BLD: NORMAL — SIGNIFICANT CHANGE UP
RBC BLD AUTO: NORMAL — SIGNIFICANT CHANGE UP

## 2023-05-04 PROCEDURE — 99253 IP/OBS CNSLTJ NEW/EST LOW 45: CPT

## 2023-05-04 PROCEDURE — 99223 1ST HOSP IP/OBS HIGH 75: CPT

## 2023-05-04 PROCEDURE — 95819 EEG AWAKE AND ASLEEP: CPT | Mod: 26

## 2023-05-04 RX ORDER — FLUOXETINE HCL 10 MG
1 CAPSULE ORAL
Qty: 0 | Refills: 0 | DISCHARGE

## 2023-05-04 RX ORDER — LANOLIN ALCOHOL/MO/W.PET/CERES
3 CREAM (GRAM) TOPICAL AT BEDTIME
Refills: 0 | Status: DISCONTINUED | OUTPATIENT
Start: 2023-05-04 | End: 2023-05-05

## 2023-05-04 RX ORDER — FOLIC ACID 0.8 MG
1 TABLET ORAL DAILY
Refills: 0 | Status: DISCONTINUED | OUTPATIENT
Start: 2023-05-04 | End: 2023-05-05

## 2023-05-04 RX ORDER — ONDANSETRON 8 MG/1
4 TABLET, FILM COATED ORAL EVERY 8 HOURS
Refills: 0 | Status: DISCONTINUED | OUTPATIENT
Start: 2023-05-04 | End: 2023-05-05

## 2023-05-04 RX ORDER — SODIUM CHLORIDE 9 MG/ML
1000 INJECTION INTRAMUSCULAR; INTRAVENOUS; SUBCUTANEOUS
Refills: 0 | Status: DISCONTINUED | OUTPATIENT
Start: 2023-05-04 | End: 2023-05-05

## 2023-05-04 RX ORDER — FLUOXETINE HCL 10 MG
20 CAPSULE ORAL DAILY
Refills: 0 | Status: DISCONTINUED | OUTPATIENT
Start: 2023-05-04 | End: 2023-05-05

## 2023-05-04 RX ORDER — FLUOXETINE HCL 10 MG
40 CAPSULE ORAL DAILY
Refills: 0 | Status: DISCONTINUED | OUTPATIENT
Start: 2023-05-04 | End: 2023-05-05

## 2023-05-04 RX ORDER — ACETAMINOPHEN 500 MG
650 TABLET ORAL EVERY 6 HOURS
Refills: 0 | Status: DISCONTINUED | OUTPATIENT
Start: 2023-05-04 | End: 2023-05-05

## 2023-05-04 RX ORDER — THIAMINE MONONITRATE (VIT B1) 100 MG
100 TABLET ORAL DAILY
Refills: 0 | Status: DISCONTINUED | OUTPATIENT
Start: 2023-05-04 | End: 2023-05-05

## 2023-05-04 RX ADMIN — Medication 2 MILLIGRAM(S): at 19:50

## 2023-05-04 RX ADMIN — Medication 1 MILLIGRAM(S): at 12:56

## 2023-05-04 RX ADMIN — Medication 2 MILLIGRAM(S): at 08:20

## 2023-05-04 RX ADMIN — Medication 100 MILLIGRAM(S): at 12:57

## 2023-05-04 RX ADMIN — SODIUM CHLORIDE 100 MILLILITER(S): 9 INJECTION INTRAMUSCULAR; INTRAVENOUS; SUBCUTANEOUS at 12:58

## 2023-05-04 RX ADMIN — Medication 2 MILLIGRAM(S): at 15:37

## 2023-05-04 RX ADMIN — Medication 100 MILLIEQUIVALENT(S): at 01:30

## 2023-05-04 RX ADMIN — Medication 40 MILLIGRAM(S): at 13:52

## 2023-05-04 RX ADMIN — Medication 2 MILLIGRAM(S): at 04:32

## 2023-05-04 RX ADMIN — Medication 20 MILLIGRAM(S): at 13:52

## 2023-05-04 RX ADMIN — Medication 2 MILLIGRAM(S): at 12:57

## 2023-05-04 NOTE — H&P ADULT - NSHPPHYSICALEXAM_GEN_ALL_CORE
Vital Signs Last 24 Hrs  T(C): 36.8 (04 May 2023 06:59), Max: 36.9 (04 May 2023 04:30)  T(F): 98.2 (04 May 2023 06:59), Max: 98.5 (04 May 2023 04:30)  HR: 76 (04 May 2023 06:59) (76 - 100)  BP: 110/60 (04 May 2023 06:59) (104/65 - 148/99)  BP(mean): --  RR: 18 (04 May 2023 06:59) (14 - 18)  SpO2: 96% (04 May 2023 06:59) (95% - 100%)    Parameters below as of 04 May 2023 06:59  Patient On (Oxygen Delivery Method): room air    PHYSICAL EXAM:      Constitutional: NAD  Eyes: perrl, no conjunctival changes  ENMT: no exudates, moist oral muc, uvula midline  Neck: no JVD, no LAD  Back: no cva tenderness  Respiratory: CTA, no exp wheezes  Cardiovascular: S1S2 reg, no murmur gallop or rub  Gastrointestinal: abd soft, NT/ND + BS  Genitourinary: voiding  Extremities: FROM, no joint effusions, no edema, no clubbing , no cyanosis  Vascular: pedal pulses + bilateral, warm extremities  Neurological: non focal, mot str 5/5/ all extr  Skin: no rashes  Lymph Nodes: no LAD

## 2023-05-04 NOTE — PATIENT PROFILE ADULT - FALL HARM RISK - HARM RISK INTERVENTIONS
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
## 2023-05-04 NOTE — H&P ADULT - ASSESSMENT
* Seizure  EEG  Neurology consult re: AED  mild hypoNa  K was replaced in ED    * Depression  on Prozac   * Seizure poss etoh wd  EEG  Neurology consult re: AED  mild hypoNa  K was replaced in ED    * [facility] protocol    * Depression  on Prozac

## 2023-05-04 NOTE — PHARMACOTHERAPY INTERVENTION NOTE - COMMENTS
Medication history complete, reviewed medication with patient and confirmed with DrFirst.  Patient states she uses an OTC medicated cream for her elbow that she gets on [employer].

## 2023-05-04 NOTE — H&P ADULT - NSHPLABSRESULTS_GEN_ALL_CORE
12.3   4.55  )-----------( 136      ( 03 May 2023 21:13 )             34.1   05-03    134<L>  |  100  |  11  ----------------------------<  87  3.3<L>   |  27  |  0.45<L>    Ca    9.2      03 May 2023 21:13  Mg     1.5     05-03    TPro  7.4  /  Alb  3.9  /  TBili  1.5<H>  /  DBili  x   /  AST  218<H>  /  ALT  188<H>  /  AlkPhos  62  05-03      < from: CT Head No Cont (05.03.23 @ 22:35) >    IMPRESSION:  No acute intracranial hemorrhage, mass effect, or midline shift.  No significant change compared with 5/14/2020.    < end of copied text >
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
## 2023-05-04 NOTE — CONSULT NOTE ADULT - ASSESSMENT
42 y/o woman PMHx of depression on Prozac, anxiety presents to the ED after witnessed seizure. Pt with hx of seizure 3 years ago.  CT head showed no acute changes, alcohol level<10.  david alcohol withdrawal seizure, r/o IC pathology, seizures.  Suggest:  watch for DTs  thiamine  ativan protocol  seizure precautions  24 video EEG  MR head wo

## 2023-05-04 NOTE — PATIENT PROFILE ADULT - CAREGIVER ADDRESS
----- Message from Evangelist Guzmán MD sent at 3/28/2017  9:55 PM CDT -----  Stress test looks good.  Curtis [place]

## 2023-05-05 ENCOUNTER — INPATIENT (INPATIENT)
Facility: HOSPITAL | Age: 43
LOS: 3 days | Discharge: HOME | DRG: 897 | End: 2023-05-09
Attending: FAMILY MEDICINE | Admitting: INTERNAL MEDICINE
Payer: COMMERCIAL

## 2023-05-05 VITALS — WEIGHT: 119.93 LBS | HEIGHT: 64 IN

## 2023-05-05 VITALS
SYSTOLIC BLOOD PRESSURE: 136 MMHG | HEART RATE: 57 BPM | RESPIRATION RATE: 17 BRPM | DIASTOLIC BLOOD PRESSURE: 88 MMHG | OXYGEN SATURATION: 99 % | TEMPERATURE: 98 F

## 2023-05-05 DIAGNOSIS — Z87.898 PERSONAL HISTORY OF OTHER SPECIFIED CONDITIONS: ICD-10-CM

## 2023-05-05 DIAGNOSIS — Z98.891 HISTORY OF UTERINE SCAR FROM PREVIOUS SURGERY: Chronic | ICD-10-CM

## 2023-05-05 LAB
ALBUMIN SERPL ELPH-MCNC: 3.8 G/DL — SIGNIFICANT CHANGE UP (ref 3.3–5)
ALP SERPL-CCNC: 65 U/L — SIGNIFICANT CHANGE UP (ref 40–120)
ALT FLD-CCNC: 139 U/L — HIGH (ref 12–78)
AMMONIA BLD-MCNC: 34 UMOL/L — HIGH (ref 11–32)
ANION GAP SERPL CALC-SCNC: 10 MMOL/L — SIGNIFICANT CHANGE UP (ref 5–17)
ANION GAP SERPL CALC-SCNC: 6 MMOL/L — SIGNIFICANT CHANGE UP (ref 5–17)
APAP SERPL-MCNC: < 2 UG/ML (ref 10–30)
APTT BLD: 26.4 SEC — LOW (ref 27.5–35.5)
AST SERPL-CCNC: 151 U/L — HIGH (ref 15–37)
BASOPHILS # BLD AUTO: 0 K/UL — SIGNIFICANT CHANGE UP (ref 0–0.2)
BASOPHILS NFR BLD AUTO: 0 % — SIGNIFICANT CHANGE UP (ref 0–2)
BILIRUB SERPL-MCNC: 1.2 MG/DL — SIGNIFICANT CHANGE UP (ref 0.2–1.2)
BUN SERPL-MCNC: 3 MG/DL — LOW (ref 7–23)
BUN SERPL-MCNC: 4 MG/DL — LOW (ref 7–23)
CALCIUM SERPL-MCNC: 8.3 MG/DL — LOW (ref 8.5–10.1)
CALCIUM SERPL-MCNC: 9.1 MG/DL — SIGNIFICANT CHANGE UP (ref 8.5–10.1)
CHLORIDE SERPL-SCNC: 103 MMOL/L — SIGNIFICANT CHANGE UP (ref 96–108)
CHLORIDE SERPL-SCNC: 103 MMOL/L — SIGNIFICANT CHANGE UP (ref 96–108)
CO2 SERPL-SCNC: 21 MMOL/L — LOW (ref 22–31)
CO2 SERPL-SCNC: 26 MMOL/L — SIGNIFICANT CHANGE UP (ref 22–31)
CREAT SERPL-MCNC: 0.31 MG/DL — LOW (ref 0.5–1.3)
CREAT SERPL-MCNC: 0.49 MG/DL — LOW (ref 0.5–1.3)
EGFR: 121 ML/MIN/1.73M2 — SIGNIFICANT CHANGE UP
EGFR: 135 ML/MIN/1.73M2 — SIGNIFICANT CHANGE UP
EOSINOPHIL # BLD AUTO: 0 K/UL — SIGNIFICANT CHANGE UP (ref 0–0.5)
EOSINOPHIL NFR BLD AUTO: 0 % — SIGNIFICANT CHANGE UP (ref 0–6)
ETHANOL SERPL-MCNC: <10 MG/DL — SIGNIFICANT CHANGE UP (ref 0–10)
GLUCOSE SERPL-MCNC: 116 MG/DL — HIGH (ref 70–99)
GLUCOSE SERPL-MCNC: 71 MG/DL — SIGNIFICANT CHANGE UP (ref 70–99)
HCT VFR BLD CALC: 34.7 % — SIGNIFICANT CHANGE UP (ref 34.5–45)
HGB BLD-MCNC: 12.2 G/DL — SIGNIFICANT CHANGE UP (ref 11.5–15.5)
INR BLD: 1.1 RATIO — SIGNIFICANT CHANGE UP (ref 0.88–1.16)
LYMPHOCYTES # BLD AUTO: 0.75 K/UL — LOW (ref 1–3.3)
LYMPHOCYTES # BLD AUTO: 22 % — SIGNIFICANT CHANGE UP (ref 13–44)
MCHC RBC-ENTMCNC: 35.2 GM/DL — SIGNIFICANT CHANGE UP (ref 32–36)
MCHC RBC-ENTMCNC: 35.3 PG — HIGH (ref 27–34)
MCV RBC AUTO: 100.3 FL — HIGH (ref 80–100)
MONOCYTES # BLD AUTO: 0.17 K/UL — SIGNIFICANT CHANGE UP (ref 0–0.9)
MONOCYTES NFR BLD AUTO: 5 % — SIGNIFICANT CHANGE UP (ref 2–14)
NEUTROPHILS # BLD AUTO: 2.47 K/UL — SIGNIFICANT CHANGE UP (ref 1.8–7.4)
NEUTROPHILS NFR BLD AUTO: 73 % — SIGNIFICANT CHANGE UP (ref 43–77)
NRBC # BLD: SIGNIFICANT CHANGE UP /100 WBCS (ref 0–0)
PLATELET # BLD AUTO: 131 K/UL — LOW (ref 150–400)
POTASSIUM SERPL-MCNC: 3.3 MMOL/L — LOW (ref 3.5–5.3)
POTASSIUM SERPL-MCNC: 4.1 MMOL/L — SIGNIFICANT CHANGE UP (ref 3.5–5.3)
POTASSIUM SERPL-SCNC: 3.3 MMOL/L — LOW (ref 3.5–5.3)
POTASSIUM SERPL-SCNC: 4.1 MMOL/L — SIGNIFICANT CHANGE UP (ref 3.5–5.3)
PROT SERPL-MCNC: 7.7 GM/DL — SIGNIFICANT CHANGE UP (ref 6–8.3)
PROTHROM AB SERPL-ACNC: 12.8 SEC — SIGNIFICANT CHANGE UP (ref 10.5–13.4)
RBC # BLD: 3.46 M/UL — LOW (ref 3.8–5.2)
RBC # FLD: 11.5 % — SIGNIFICANT CHANGE UP (ref 10.3–14.5)
SALICYLATES SERPL-MCNC: <1.7 MG/DL — LOW (ref 2.8–20)
SODIUM SERPL-SCNC: 134 MMOL/L — LOW (ref 135–145)
SODIUM SERPL-SCNC: 135 MMOL/L — SIGNIFICANT CHANGE UP (ref 135–145)
WBC # BLD: 3.39 K/UL — LOW (ref 3.8–10.5)
WBC # FLD AUTO: 3.39 K/UL — LOW (ref 3.8–10.5)

## 2023-05-05 PROCEDURE — 95720 EEG PHY/QHP EA INCR W/VEEG: CPT

## 2023-05-05 PROCEDURE — 99284 EMERGENCY DEPT VISIT MOD MDM: CPT

## 2023-05-05 PROCEDURE — 99232 SBSQ HOSP IP/OBS MODERATE 35: CPT

## 2023-05-05 PROCEDURE — 84100 ASSAY OF PHOSPHORUS: CPT

## 2023-05-05 PROCEDURE — 93010 ELECTROCARDIOGRAM REPORT: CPT

## 2023-05-05 PROCEDURE — 80048 BASIC METABOLIC PNL TOTAL CA: CPT

## 2023-05-05 PROCEDURE — 36415 COLL VENOUS BLD VENIPUNCTURE: CPT

## 2023-05-05 PROCEDURE — 80074 ACUTE HEPATITIS PANEL: CPT

## 2023-05-05 PROCEDURE — 99285 EMERGENCY DEPT VISIT HI MDM: CPT

## 2023-05-05 PROCEDURE — 80076 HEPATIC FUNCTION PANEL: CPT

## 2023-05-05 PROCEDURE — 83735 ASSAY OF MAGNESIUM: CPT

## 2023-05-05 PROCEDURE — 85025 COMPLETE CBC W/AUTO DIFF WBC: CPT

## 2023-05-05 PROCEDURE — 85027 COMPLETE CBC AUTOMATED: CPT

## 2023-05-05 PROCEDURE — 80053 COMPREHEN METABOLIC PANEL: CPT

## 2023-05-05 RX ORDER — FLUOXETINE HCL 10 MG
1 CAPSULE ORAL
Refills: 0 | DISCHARGE

## 2023-05-05 RX ADMIN — Medication 100 MILLIGRAM(S): at 10:20

## 2023-05-05 RX ADMIN — Medication 1 MILLIGRAM(S): at 19:45

## 2023-05-05 RX ADMIN — Medication 1.5 MILLIGRAM(S): at 10:19

## 2023-05-05 RX ADMIN — Medication 2 MILLIGRAM(S): at 07:53

## 2023-05-05 RX ADMIN — Medication 2 MILLIGRAM(S): at 00:06

## 2023-05-05 RX ADMIN — Medication 2 MILLIGRAM(S): at 01:28

## 2023-05-05 RX ADMIN — Medication 2 MILLIGRAM(S): at 05:36

## 2023-05-05 RX ADMIN — Medication 2 MILLIGRAM(S): at 03:30

## 2023-05-05 RX ADMIN — Medication 1 MILLIGRAM(S): at 10:19

## 2023-05-05 RX ADMIN — SODIUM CHLORIDE 100 MILLILITER(S): 9 INJECTION INTRAMUSCULAR; INTRAVENOUS; SUBCUTANEOUS at 00:06

## 2023-05-05 RX ADMIN — Medication 40 MILLIGRAM(S): at 10:20

## 2023-05-05 RX ADMIN — Medication 20 MILLIGRAM(S): at 10:20

## 2023-05-05 RX ADMIN — SODIUM CHLORIDE 100 MILLILITER(S): 9 INJECTION INTRAMUSCULAR; INTRAVENOUS; SUBCUTANEOUS at 10:25

## 2023-05-05 NOTE — ED ADULT NURSE NOTE - OBJECTIVE STATEMENT
Pt is a 73 YOF complaining of seizures. Pt states " this is the second seizure julian had in my life". Pt reports daily ETOH use. Pt is GCS 15, HEENT clear, PERRL, PMS x4. 20 g IV inserted labs drawn.

## 2023-05-05 NOTE — ED ADULT TRIAGE NOTE - ARRIVAL FROM
Problem: Pressure Injury, Risk for  Goal: No new pressure injury (PI) development  4/25/2022 0246 by Jerri Akers RN  Outcome: Outcome Met, Continue evaluating goal progress toward completion  4/25/2022 0244 by Jerri Akers RN  Outcome: Outcome Met, Continue evaluating goal progress toward completion     Problem: Pressure Injury Actual  Goal: # No deterioration in pressure injury (PI)  4/25/2022 0246 by Jerri Akers RN  Outcome: Outcome Met, Continue evaluating goal progress toward completion  4/25/2022 0244 by Jerri Akesr RN  Outcome: Outcome Met, Continue evaluating goal progress toward completion     Problem: Non-Pressure Injury Wound  Goal: # No deterioration in wound  Outcome: Outcome Met, Continue evaluating goal progress toward completion     Problem: Diabetes  Goal: Glycemic balance achieved/maintained  Description: Goal is to maintain blood sugar within range with no episodes of hypoglycemia  Outcome: Outcome Met, Continue evaluating goal progress toward completion     Problem: VTE, Risk for  Goal: # No s/s of VTE  4/25/2022 0246 by Jerri Akers RN  Outcome: Outcome Met, Continue evaluating goal progress toward completion  4/25/2022 0244 by Jerri Akers RN  Outcome: Outcome Met, Continue evaluating goal progress toward completion     Problem: Dysphagia  Goal: # Exhibits no s/s of aspiration  Description: Symptoms of aspiration include coughing, choking, throat clearing, change in voice quality, and/or a decrease in oxygen saturation by more than 2% points from baseline after swallowing.  4/25/2022 0246 by Jerri Akers RN  Outcome: Outcome Met, Continue evaluating goal progress toward completion  4/25/2022 0244 by Jerri Akers RN  Outcome: Outcome Met, Continue evaluating goal progress toward completion     Problem: Fluid Volume Excess, Risk for  Goal: # Absence of Rapid Weight Gain (no more than 2kg in 24 hours)  Description: FVE Risk Patients may gain  weight (but not more than 2 kg) but may not require aggressive treatment if in the absence of dyspnea; FVE (actual) patients should be monitored to achieve no weight gain.   Outcome: Outcome Met, Continue evaluating goal progress toward completion     Problem: Fluid Volume Excess  Goal: # Fluid Volume Excess Symptoms Resolved  Description: Treatment often consists of oxygen and respiratory support with diuretic therapy at doses that exceed usual dose (typically doubled).  Monitor patient response to treatment.    Acute dyspnea should resolve quickly if dose is adequate and kidney function is adequate. Dyspnea/SOB should only be observed with Activity after effective treatment. Patient should be able to lie down comfortably, without SOB.  Outcome: Outcome Met, Continue evaluating goal progress toward completion     Problem: Potential for injury, Restraints  Goal: # Remains free from injury  Outcome: Outcome Met, Continue evaluating goal progress toward completion     Problem: Activity Intolerance  Goal: # Functional status is maintained or returned to baseline  4/25/2022 0246 by Jerri Akers RN  Outcome: Outcome Not Met, Continue to Monitor  4/25/2022 0244 by Jerri Aekrs RN  Outcome: Outcome Not Met, Continue to Monitor     Problem: Potential for injury, Restraints  Goal: Verbalizes criteria for restraint discontinuation  Description: Document on Patient Education Activity  Outcome: Outcome Not Met, Continue to Monitor      Home

## 2023-05-05 NOTE — SBIRT NOTE ADULT - NSSBIRTALCACTION/INTER_GEN_A_CORE
Pt denies concerns with the amount she consumes, reports she can stop drinking when she wants, drinks socially with friends. Pt declines treatment referral.

## 2023-05-05 NOTE — ED ADULT NURSE REASSESSMENT NOTE - NS ED NURSE REASSESS COMMENT FT1
Received report from previous shift RN at approx 1900, pt assessed at bedside. Pt AxO2, unable to identify year or why she was brought to hospital. Pt appears disheveled, tearful, confused. Parents and  at bedside, concerned patient will attempt to elope. Charge RN aware, 1:1 in place for elopement risk. Patient and family updated about plan of care, all questions answered at this time. Received report from previous shift RN at approx 1900, pt assessed at bedside. Pt AxO2, unable to identify year or why she was brought to hospital. Pt appears disheveled, tearful, confused. Refusing cardiac monitor, educated about importance of tele monitoring, still refused. Parents and  at bedside, concerned patient will attempt to elope. Charge RN aware, 1:1 in place for elopement risk. Patient and family updated about plan of care, all questions answered at this time.

## 2023-05-05 NOTE — ED ADULT TRIAGE NOTE - CHIEF COMPLAINT QUOTE
Pt arrives to ED s/p seizure last wednesday. Pt was in ED this morning for treatment "but had to leave." Hx two seizures in her life, no antiseizure medications.

## 2023-05-05 NOTE — CHART NOTE - NSCHARTNOTEFT_GEN_A_CORE
Pt seen this am, calm collected, no visible tremors, stated she has to go home where she has ativan due to the fact that she has 2 kids that need her; she was still wearing the lectrodes and I told her first the eeg has to be removed and leaving is not what she wants to due, that she should ask for help. Pt said she would think about it, next I hear she wants to leave asap. Pt didn't wait for me and she left.

## 2023-05-05 NOTE — PROGRESS NOTE ADULT - SUBJECTIVE AND OBJECTIVE BOX
HPI:  44 y/o woman  PMHx of depression on Prozac, anxiety presents to the ED with sister BIBA s/p  seizure.  Pt with hx of seizure 3 years ago, never saw a neurologist at the time.     MEDICATIONS  (STANDING):  FLUoxetine 20 milliGRAM(s) Oral daily  FLUoxetine 40 milliGRAM(s) Oral daily  folic acid 1 milliGRAM(s) Oral daily  LORazepam   Injectable   IV Push   LORazepam   Injectable 1.5 milliGRAM(s) IV Push every 4 hours  sodium chloride 0.9%. 1000 milliLiter(s) (100 mL/Hr) IV Continuous <Continuous>  thiamine 100 milliGRAM(s) Oral daily    MEDICATIONS  (PRN):  acetaminophen     Tablet .. 650 milliGRAM(s) Oral every 6 hours PRN Temp greater or equal to 38C (100.4F), Mild Pain (1 - 3)  aluminum hydroxide/magnesium hydroxide/simethicone Suspension 30 milliLiter(s) Oral every 4 hours PRN Dyspepsia  melatonin 3 milliGRAM(s) Oral at bedtime PRN Insomnia  ondansetron Injectable 4 milliGRAM(s) IV Push every 8 hours PRN Nausea and/or Vomiting      Vital Signs Last 24 Hrs  T(C): 36.6 (05 May 2023 07:53), Max: 37.1 (04 May 2023 23:41)  T(F): 97.9 (05 May 2023 07:53), Max: 98.8 (04 May 2023 23:41)  HR: 57 (05 May 2023 07:53) (57 - 78)  BP: 136/88 (05 May 2023 07:53) (100/73 - 144/91)  RR: 17 (05 May 2023 07:53) (16 - 18)  SpO2: 99% (05 May 2023 07:53) (99% - 100%)    Parameters below as of 05 May 2023 07:53  Patient On (Oxygen Delivery Method): room air      Neurological Exam:  HF: Patient is alert and oriented x 3. There is no aphasia or dysarthria. Follows complex commands.   CN: Pupils are equal and reactive. Extra ocular muscles are intact. There is no facial droop or asymmetry. Tongue is midline. Sensation is intact in the face. Other CN II-XII are intact.   Motor: motor examination all muscles are 5/5 and there is no pronator drift.   Sensory: intact to pinprick and touch. VS intact  DTR: 2/4 all 4 extremities. Babinski is negative bilateral.  Co-ord:  Finger to finger to nose is intact. Heel to shin is intact bilaterally.   Gait/balance: Patient ambulates without difficulty.     Basic Metabolic Panel in AM (05.05.23 @ 05:52)   Sodium, Serum: 134 mmol/L  Potassium, Serum: 3.3 mmol/L  Chloride, Serum: 103 mmol/L  Carbon Dioxide, Serum: 21 mmol/L  Anion Gap, Serum: 10 mmol/L  Blood Urea Nitrogen, Serum: 3 mg/dL  Creatinine, Serum: 0.31 mg/dL  Glucose, Serum: 71 mg/dL  Calcium, Total Serum: 8.3 mg/dL  eGFR: 135:< from: CT Head No Cont (05.03.23 @ 22:35) >  FINDINGS:    PARENCHYMA AND VENTRICLES: No acute intracranial hemorrhage, mass effect,   or midline shift. No hydrocephalus.  EXTRA-AXIAL: No abnormal extraaxial collection.  PARANASAL SINUSES: Within normal limits.  TYMPANOMASTOID CAVITIES: Within normal limits.  ORBITS: Within normal limits.  CALVARIUM: Within normal limits.  MISCELLANEOUS: None    IMPRESSION:  No acute intracranial hemorrhage, mass effect, or midline shift.  No significant change compared with 5/14/2020.    < end of copied text >    < from: EEG w/ Video Each 12-26 Hours, Unmonitored (05.05.23 @ 09:36) >    INTERPRETATION:  16-channel video EEG recording performed for this   43-year-old woman admitted after reported generalized seizure, second   episode in 3 years. History of alcohol abuse.  The recording was started May 4, 2023 at 12:22 PM, and terminated May 5,   2023 at 9:37 AM.    The background activities consist of bilateral symmetrical 10-11 Hz,   occipitally dominant low amplitude activity which attenuates with eye   opening. There is bilateral diffuse 15-20 Hz low amplitude activities.    All stages of sleep were recorded, transition to a wakeful state was   without any abnormalities.    No focal slowing or epileptiform activities noted.  Reviewing the automatic spike and seizure detection subtle results, all   events recorded into be electrical, movement, muscle artifacts, and or   physiologic variants.  There were no patient derived push events recorded.    At times the record is obscured by muscle, movement, electrode artifacts.    The EKG portion of record demonstrated a regular rhythm.    IMPRESSION: Normal 24 hour video EEG recording, performed with the   patient awake, drowsy and asleep. If a seizure disorder is suspected, a   repeat EEG is recommended.    < end of copied text >

## 2023-05-05 NOTE — PROGRESS NOTE ADULT - ASSESSMENT
42 y/o woman PMHx of depression on Prozac, anxiety presents to the ED after witnessed seizure. Pt with hx of seizure 3 years ago.  CT head showed no acute changes, alcohol level<10. 24 video EEG is normal. likely alcohol withdrawal seizure,  Suggest:  thiamine  ativan protocol  seizure precautions  MR head wo, can be done as OPT

## 2023-05-05 NOTE — PHARMACOTHERAPY INTERVENTION NOTE - COMMENTS
Medication history complete, reviewed medications with patient parents at bedside and confirmed with doctor first med hx.

## 2023-05-05 NOTE — ED PROVIDER NOTE - PROGRESS NOTE DETAILS
Elsa Childs   spoke with Dr. Chaves psychiatrist [phone] who stated that the  has called her and left a message that the pt was at . Treats her for anxiety and depression pt takes Ativan. reviews pt past admission, told pt she is admitted again. Pt requests psych consult.

## 2023-05-05 NOTE — ED PROVIDER NOTE - CLINICAL SUMMARY MEDICAL DECISION MAKING FREE TEXT BOX
pt with a hx of seizure and apparently ETOH abuse also on Ativan who left AMA this morning during her workup for the seizure who returns now wishing to finish her workup. Labs

## 2023-05-05 NOTE — ED STATDOCS - PROGRESS NOTE DETAILS
pt is a 44 y/o F w/ a PMHx of Szs 3 years prior who recently presented with seizures and was admitted for further workup and CIWA and suspicion for ETOH withdrawal seizures. negative CT head. brief Exam: patient with slurring of speech and appears intoxicated. Had left to take care of children. given concern for possible intoxication and withdrawal seizures will send to [facility] for further workup and likely need ofr admission and possible further workup.   -JAROD Cronin-MS, MD  Internal/Emergency/Critical Medicine
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
## 2023-05-06 DIAGNOSIS — F41.9 ANXIETY DISORDER, UNSPECIFIED: ICD-10-CM

## 2023-05-06 LAB
ALBUMIN SERPL ELPH-MCNC: 3.2 G/DL — LOW (ref 3.3–5)
ALBUMIN SERPL ELPH-MCNC: 3.6 G/DL — SIGNIFICANT CHANGE UP (ref 3.3–5)
ALP SERPL-CCNC: 54 U/L — SIGNIFICANT CHANGE UP (ref 40–120)
ALP SERPL-CCNC: 58 U/L — SIGNIFICANT CHANGE UP (ref 40–120)
ALT FLD-CCNC: 122 U/L — HIGH (ref 12–78)
ALT FLD-CCNC: 129 U/L — HIGH (ref 12–78)
ANION GAP SERPL CALC-SCNC: 7 MMOL/L — SIGNIFICANT CHANGE UP (ref 5–17)
ANION GAP SERPL CALC-SCNC: 9 MMOL/L — SIGNIFICANT CHANGE UP (ref 5–17)
AST SERPL-CCNC: 137 U/L — HIGH (ref 15–37)
AST SERPL-CCNC: 144 U/L — HIGH (ref 15–37)
BASOPHILS # BLD AUTO: 0.04 K/UL — SIGNIFICANT CHANGE UP (ref 0–0.2)
BASOPHILS NFR BLD AUTO: 1 % — SIGNIFICANT CHANGE UP (ref 0–2)
BILIRUB DIRECT SERPL-MCNC: 0.4 MG/DL — HIGH (ref 0–0.3)
BILIRUB DIRECT SERPL-MCNC: 0.5 MG/DL — HIGH (ref 0–0.3)
BILIRUB INDIRECT FLD-MCNC: 0.8 MG/DL — SIGNIFICANT CHANGE UP (ref 0.2–1)
BILIRUB INDIRECT FLD-MCNC: 0.9 MG/DL — SIGNIFICANT CHANGE UP (ref 0.2–1)
BILIRUB SERPL-MCNC: 1.3 MG/DL — HIGH (ref 0.2–1.2)
BILIRUB SERPL-MCNC: 1.3 MG/DL — HIGH (ref 0.2–1.2)
BUN SERPL-MCNC: 5 MG/DL — LOW (ref 7–23)
BUN SERPL-MCNC: 5 MG/DL — LOW (ref 7–23)
CALCIUM SERPL-MCNC: 8.6 MG/DL — SIGNIFICANT CHANGE UP (ref 8.5–10.1)
CALCIUM SERPL-MCNC: 8.8 MG/DL — SIGNIFICANT CHANGE UP (ref 8.5–10.1)
CHLORIDE SERPL-SCNC: 105 MMOL/L — SIGNIFICANT CHANGE UP (ref 96–108)
CHLORIDE SERPL-SCNC: 105 MMOL/L — SIGNIFICANT CHANGE UP (ref 96–108)
CO2 SERPL-SCNC: 23 MMOL/L — SIGNIFICANT CHANGE UP (ref 22–31)
CO2 SERPL-SCNC: 26 MMOL/L — SIGNIFICANT CHANGE UP (ref 22–31)
CREAT SERPL-MCNC: 0.29 MG/DL — LOW (ref 0.5–1.3)
CREAT SERPL-MCNC: 0.31 MG/DL — LOW (ref 0.5–1.3)
EGFR: 135 ML/MIN/1.73M2 — SIGNIFICANT CHANGE UP
EGFR: 137 ML/MIN/1.73M2 — SIGNIFICANT CHANGE UP
EOSINOPHIL # BLD AUTO: 0.08 K/UL — SIGNIFICANT CHANGE UP (ref 0–0.5)
EOSINOPHIL NFR BLD AUTO: 2.1 % — SIGNIFICANT CHANGE UP (ref 0–6)
GLUCOSE SERPL-MCNC: 97 MG/DL — SIGNIFICANT CHANGE UP (ref 70–99)
GLUCOSE SERPL-MCNC: 97 MG/DL — SIGNIFICANT CHANGE UP (ref 70–99)
HCT VFR BLD CALC: 32.5 % — LOW (ref 34.5–45)
HGB BLD-MCNC: 11.3 G/DL — LOW (ref 11.5–15.5)
IMM GRANULOCYTES NFR BLD AUTO: 0.5 % — SIGNIFICANT CHANGE UP (ref 0–0.9)
LYMPHOCYTES # BLD AUTO: 0.89 K/UL — LOW (ref 1–3.3)
LYMPHOCYTES # BLD AUTO: 23.3 % — SIGNIFICANT CHANGE UP (ref 13–44)
MAGNESIUM SERPL-MCNC: 1.6 MG/DL — SIGNIFICANT CHANGE UP (ref 1.6–2.6)
MAGNESIUM SERPL-MCNC: 2.3 MG/DL — SIGNIFICANT CHANGE UP (ref 1.6–2.6)
MCHC RBC-ENTMCNC: 34.8 GM/DL — SIGNIFICANT CHANGE UP (ref 32–36)
MCHC RBC-ENTMCNC: 34.9 PG — HIGH (ref 27–34)
MCV RBC AUTO: 100.3 FL — HIGH (ref 80–100)
MONOCYTES # BLD AUTO: 0.3 K/UL — SIGNIFICANT CHANGE UP (ref 0–0.9)
MONOCYTES NFR BLD AUTO: 7.9 % — SIGNIFICANT CHANGE UP (ref 2–14)
NEUTROPHILS # BLD AUTO: 2.49 K/UL — SIGNIFICANT CHANGE UP (ref 1.8–7.4)
NEUTROPHILS NFR BLD AUTO: 65.2 % — SIGNIFICANT CHANGE UP (ref 43–77)
PHOSPHATE SERPL-MCNC: 3.5 MG/DL — SIGNIFICANT CHANGE UP (ref 2.5–4.5)
PHOSPHATE SERPL-MCNC: 3.7 MG/DL — SIGNIFICANT CHANGE UP (ref 2.5–4.5)
PLATELET # BLD AUTO: 116 K/UL — LOW (ref 150–400)
POTASSIUM SERPL-MCNC: 3.3 MMOL/L — LOW (ref 3.5–5.3)
POTASSIUM SERPL-MCNC: 3.5 MMOL/L — SIGNIFICANT CHANGE UP (ref 3.5–5.3)
POTASSIUM SERPL-SCNC: 3.3 MMOL/L — LOW (ref 3.5–5.3)
POTASSIUM SERPL-SCNC: 3.5 MMOL/L — SIGNIFICANT CHANGE UP (ref 3.5–5.3)
PROT SERPL-MCNC: 6.3 GM/DL — SIGNIFICANT CHANGE UP (ref 6–8.3)
PROT SERPL-MCNC: 6.9 GM/DL — SIGNIFICANT CHANGE UP (ref 6–8.3)
RBC # BLD: 3.24 M/UL — LOW (ref 3.8–5.2)
RBC # FLD: 11.5 % — SIGNIFICANT CHANGE UP (ref 10.3–14.5)
SODIUM SERPL-SCNC: 137 MMOL/L — SIGNIFICANT CHANGE UP (ref 135–145)
SODIUM SERPL-SCNC: 138 MMOL/L — SIGNIFICANT CHANGE UP (ref 135–145)
WBC # BLD: 3.82 K/UL — SIGNIFICANT CHANGE UP (ref 3.8–10.5)
WBC # FLD AUTO: 3.82 K/UL — SIGNIFICANT CHANGE UP (ref 3.8–10.5)

## 2023-05-06 PROCEDURE — 99223 1ST HOSP IP/OBS HIGH 75: CPT

## 2023-05-06 PROCEDURE — 12345: CPT | Mod: NC

## 2023-05-06 PROCEDURE — 99252 IP/OBS CONSLTJ NEW/EST SF 35: CPT

## 2023-05-06 RX ORDER — MAGNESIUM SULFATE 500 MG/ML
1 VIAL (ML) INJECTION ONCE
Refills: 0 | Status: COMPLETED | OUTPATIENT
Start: 2023-05-06 | End: 2023-05-06

## 2023-05-06 RX ORDER — POTASSIUM CHLORIDE 20 MEQ
40 PACKET (EA) ORAL ONCE
Refills: 0 | Status: COMPLETED | OUTPATIENT
Start: 2023-05-06 | End: 2023-05-06

## 2023-05-06 RX ORDER — THIAMINE MONONITRATE (VIT B1) 100 MG
100 TABLET ORAL DAILY
Refills: 0 | Status: COMPLETED | OUTPATIENT
Start: 2023-05-06 | End: 2023-05-08

## 2023-05-06 RX ORDER — SODIUM CHLORIDE 9 MG/ML
1000 INJECTION INTRAMUSCULAR; INTRAVENOUS; SUBCUTANEOUS
Refills: 0 | Status: COMPLETED | OUTPATIENT
Start: 2023-05-06 | End: 2023-05-06

## 2023-05-06 RX ORDER — FOLIC ACID 0.8 MG
1 TABLET ORAL DAILY
Refills: 0 | Status: DISCONTINUED | OUTPATIENT
Start: 2023-05-06 | End: 2023-05-09

## 2023-05-06 RX ORDER — FLUOXETINE HCL 10 MG
60 CAPSULE ORAL DAILY
Refills: 0 | Status: DISCONTINUED | OUTPATIENT
Start: 2023-05-06 | End: 2023-05-09

## 2023-05-06 RX ADMIN — Medication 40 MILLIEQUIVALENT(S): at 06:09

## 2023-05-06 RX ADMIN — SODIUM CHLORIDE 70 MILLILITER(S): 9 INJECTION INTRAMUSCULAR; INTRAVENOUS; SUBCUTANEOUS at 02:31

## 2023-05-06 RX ADMIN — Medication 2 MILLIGRAM(S): at 10:17

## 2023-05-06 RX ADMIN — Medication 2 MILLIGRAM(S): at 06:10

## 2023-05-06 RX ADMIN — Medication 2 MILLIGRAM(S): at 15:37

## 2023-05-06 RX ADMIN — Medication 2 MILLIGRAM(S): at 01:03

## 2023-05-06 RX ADMIN — Medication 1 TABLET(S): at 10:17

## 2023-05-06 RX ADMIN — Medication 2 MILLIGRAM(S): at 21:14

## 2023-05-06 RX ADMIN — Medication 2 MILLIGRAM(S): at 17:51

## 2023-05-06 RX ADMIN — Medication 2 MILLIGRAM(S): at 02:31

## 2023-05-06 RX ADMIN — Medication 100 GRAM(S): at 06:09

## 2023-05-06 RX ADMIN — Medication 60 MILLIGRAM(S): at 10:16

## 2023-05-06 RX ADMIN — Medication 1 MILLIGRAM(S): at 10:16

## 2023-05-06 RX ADMIN — Medication 100 MILLIGRAM(S): at 10:17

## 2023-05-06 NOTE — BH CONSULTATION LIAISON ASSESSMENT NOTE - CURRENT MEDICATION
MEDICATIONS  (STANDING):  FLUoxetine 60 milliGRAM(s) Oral daily  folic acid 1 milliGRAM(s) Oral daily  LORazepam     Tablet 2 milliGRAM(s) Oral every 4 hours  LORazepam     Tablet   Oral   multivitamin 1 Tablet(s) Oral daily  thiamine 100 milliGRAM(s) Oral daily    MEDICATIONS  (PRN):  LORazepam     Tablet 2 milliGRAM(s) Oral every 1 hour PRN Symptom-triggered: each CIWA -Ar score 8 or GREATER  LORazepam     Tablet 2 milliGRAM(s) Oral every 2 hours PRN Symptom-triggered 2 point increase in CIWA-Ar  LORazepam   Injectable 2 milliGRAM(s) IV Push every 2 hours PRN CIWA-Ar score increase by 2 points and a total score of 7 or less  LORazepam   Injectable 2 milliGRAM(s) IV Push every 1 hour PRN CIWA-Ar score 8 or greater

## 2023-05-06 NOTE — PROGRESS NOTE ADULT - ATTENDING COMMENTS
43 year old female with a PMHx significant for depression and anxiety on Fluoxetine, and prior seizure (3 years ago) who was admitted from 5/3 - 5/5 for management of new onset seizures with etiology likely consistent severe EtOH withdrawal who reportedly signed out against medical advice on 5/5 returns to  for further management of ongoing symptoms of withdrawal including increased confusion. The patient denies any alcohol ingestion while home today.    [] Alcohol Withdrawal Seizures  - admit to Medicine  - cont. seizure precautions  - Neurology recs appreciated MRI deferred outpt, Pt to speak with  to discuss if she wants it done  - EEG negative  - will hold AEDs for now  - Lorazepam 2mg IVP prn seizure  - cont. CIWA protocol  - watch for DTs  - cont. Thiamine 100mg po daily  - cont. Folic acid 1mg po daily  - cont. MVI 1tab po daily   - cont. IV hydration  - strict I/Os     [] Depression and Anxiety  - cont. Fluoxetine 60mg po daily   - f/u Psychiatry consult    [] Vte ppx  ~cont. SCDs for now

## 2023-05-06 NOTE — H&P ADULT - HISTORY OF PRESENT ILLNESS
43 year old female with a PMHx significant for depression and anxiety on Fluoxetine, and prior seizure (3 years ago) who was admitted from 5/3 - 5/5 for management of new onset seizures with etiology likely consistent severe EtOH withdrawal who reportedly signed out against medical advice on 5/5 returns to  for further management of ongoing symptoms of withdrawal including increased confusion. The patient denies any alcohol ingestion while home today.

## 2023-05-06 NOTE — BH CONSULTATION LIAISON ASSESSMENT NOTE - SUMMARY
43 year old female with a h/o depression and anxiety on Fluoxetine presented to the ED yesterday after a seizure, possibly associated with alcohol withdrawal but who left the ED AMA but returned to complete assessment/management following another seizure and increasing confusion. The patient denied any alcohol ingestion yesterday prior to ED visit. She admits to drinking 1 bottle of wine most days of the week. 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 Patient reports starting treatment with a family therapist initially when she was in 10th grade because of her brother who is diagnosed with bipolar disorder then she continued individual therapy "on and off" for her own issues while growing up. Sees a psychiatrist consistently, Dr. Fatima, consistently now. Denies any other medication trials. Denies known drug allergies. Denies other drug/cigarette use.   MSE: 43 year-old female who looks stated age, lying in bed, cooperative. Eye contact is fair. She is alert and oriented x4. Speech of average rate and volume, thoughts linear and goal directed. Denies s/h I/p/i, or a/v hallucinations or paranoia. Mood "okay just tired", affect neutral and congruent. Insight and judgment good.  Case discussed with Dr. Avalos and 2S staff. Patient does not meet the criteria for inpatient psychiatric admission at this time and is able to engage in safety plan. Plan to continue CIWA monitoring and Alcohol withdrawal protocol of Ativan. Continue Fluoxetine 60mg po daily.    Axis I - Alcohol Withdrawal (F10.939); Anxiety & Depression from history (F41.9)  Axis II - Deferred  Axis III - Withdrawal seizures

## 2023-05-06 NOTE — BH CONSULTATION LIAISON ASSESSMENT NOTE - RISK ASSESSMENT
Acute Risk factors: Alcohol abuse history, h/o depression though not currently acute  Protective factors: No h/o psychiatric admissions, engagement in outpt psychiatric treatment, futuristic thinking, stable relationship and residence

## 2023-05-06 NOTE — H&P ADULT - NSHPPHYSICALEXAM_GEN_ALL_CORE
Vital Signs Last 24 Hrs  T(C): 36.8 (06 May 2023 01:01), Max: 37.1 (05 May 2023 03:21)  T(F): 98.2 (06 May 2023 01:01), Max: 98.8 (05 May 2023 03:21)  HR: 79 (06 May 2023 01:01) (57 - 80)  BP: 122/97 (06 May 2023 01:01) (118/83 - 144/91)  BP(mean): 106 (06 May 2023 01:01) (95 - 106)  RR: 18 (06 May 2023 01:01) (16 - 18)  SpO2: 99% (06 May 2023 01:01) (99% - 100%)    Parameters below as of 06 May 2023 01:01  Patient On (Oxygen Delivery Method): room air

## 2023-05-06 NOTE — PATIENT PROFILE ADULT - NS PRO AD NO ADVANCE DIRECTIVE
[address] [facility]        Pt Name: Mayco Cross  MRN: [id number]  [place] 1963  Date of evaluation: 7/3/2022  Provider: MARICRUZ Alas - GWEN  PCP: Cj Menjivar MD  Note Started: 12:50 PM EDT       PRANAY. I have evaluated this patient. My supervising physician was available for consultation. CHIEF COMPLAINT       Chief Complaint   Patient presents with    Chest Pain       HISTORY OF PRESENT ILLNESS   (Location, Timing/Onset, Context/Setting, Quality, Duration, Modifying Factors, Severity, Associated Signs and Symptoms)  Note limiting factors. Chief Complaint: Chest pain    Mayco Cross is a 63 y.o. female who presents to the emergency department with complaints of substernal chest pain since last night. Started at rest.  Nonexertional.  Nonradiating. No associated nausea vomiting or diaphoresis. No associated shortness of breath with this. She has a history of fibromyalgia and PFO. She also smokes cigarettes. Denies any history of hypertension, hyperlipidemia, diabetes, recent travel or surgery. Denies any calf pain or tenderness or leg swelling. No relieving or exacerbating symptoms. Came to the emergency department for further evaluation and treatment    Nursing Notes were all reviewed and agreed with or any disagreements were addressed in the HPI. REVIEW OF SYSTEMS    (2-9 systems for level 4, 10 or more for level 5)     Review of Systems   Constitutional: Negative for chills, fatigue and fever. HENT: Negative for congestion and sore throat. Eyes: Negative for pain and visual disturbance. Respiratory: Negative for cough, chest tightness, shortness of breath and wheezing. Cardiovascular: Positive for chest pain. Negative for palpitations and leg swelling. Gastrointestinal: Negative for abdominal pain, constipation, diarrhea, nausea and vomiting.    Genitourinary: Negative for dysuria and hematuria. Musculoskeletal: Negative for back pain, myalgias, neck pain and neck stiffness. Skin: Negative for rash and wound. Allergic/Immunologic: Negative for immunocompromised state. Neurological: Negative for dizziness and light-headedness. All other systems reviewed and are negative. Positives and Pertinent negatives as per HPI. Except as noted above in the ROS, all other systems were reviewed and negative.        PAST MEDICAL HISTORY     Past Medical History:   Diagnosis Date    Allergic rhinitis     Anxiety     Cancer ([facility] [address]     skin-squamous cell-right shoulder    Essential hypertension 9/22/2020    Migraine with aura and without status migrainosus, not intractable 9/1/2020    Obesity     Osteoarthritis of knee 10/14/2014    PFO (patent foramen ovale)     RA (rheumatoid arthritis) ([facility] [address]     Dr Librado Nesbitt for falls     Tobacco abuse          SURGICAL HISTORY     Past Surgical History:   Procedure Laterality Date   [address] [facility], [address]    accident    HYSTERECTOMY (CERVIX STATUS UNKNOWN)  2010    ABIMBOLA/BSO    NASAL FRACTURE SURGERY  2000    PAROTIDECTOMY Left 05/11/2021    LEFT PAROTIDECTOMY-[place] tumor WITH FACIAL NERVE DISSECTION performed by Ana Parikh MD at [facility] Right 6/1/2021    RIGHT PAROTIDECTOMY WITH FACIAL NERVE DISSECTION performed by Ana Parikh MD at  [address] 05/11/2021    SINUS ENDOSCOPY FUNCTIONAL SURGERY IMAGE GUIDED performed by Ana Parikh MD at [address] [facility]  2010    s/p fall    WRIST SURGERY Bilateral     2010 & 2018 Tendon Repair & CTS         CURRENTMEDICATIONS       Previous Medications    ASPIRIN 325 MG TABLET    Take 325 mg by mouth daily    CALCIUM CARBONATE (OYSTER SHELL CALCIUM 500 MG) 1250 (500 CA) MG TABLET    Take 1 tablet by mouth daily    CETIRIZINE (ZYRTEC) 10 MG TABLET    Take 10 mg by mouth daily CYCLOBENZAPRINE (FLEXERIL) 5 MG TABLET    Take 5 mg by mouth nightly as needed     DULOXETINE (CYMBALTA) 60 MG EXTENDED RELEASE CAPSULE    TAKE 1 CAPSULE BY MOUTH DAILY    ESTRADIOL (CLIMARA) 0.0375 MG/24HR    Place 1 patch onto the skin See Admin Instructions Twice weekly    HYDROXYCHLOROQUINE (PLAQUENIL) 200 MG TABLET    Take 200 mg by mouth 2 times daily. MULTIPLE VITAMINS-MINERALS (MULTIVITAMIN ADULT) TABS    Take 1 tablet by mouth daily         ALLERGIES     Patient has no known allergies. FAMILYHISTORY       Family History   Problem Relation Age of Onset    Pancreatic Cancer Mother 76    Lung Cancer Mother     Cancer Mother     Kidney Cancer Father     Cancer Father     Lung Cancer Father           SOCIAL HISTORY       Social History     Tobacco Use    Smoking status: Current Every Day Smoker     Packs/day: 1.00     Years: 40.00     Pack years: 40.00     Types: Cigarettes     Start date: 3/26/1984    Smokeless tobacco: Never Used   Vaping Use    Vaping Use: Never used   Substance Use Topics    Alcohol use: Yes     Comment: 2-3 times a year    Drug use: Not Currently       SCREENINGS    [place] Coma Scale  Eye Opening: Spontaneous  Best Verbal Response: Oriented  Best Motor Response: Obeys commands  [place] Coma Scale Score: 15 Heart Score for chest pain patients  History: Slightly Suspicious  ECG: Non-Specifc repolarization disturbance/LBTB/PM  Patient Age: > 39 and < 65 years  *Risk factors for Atherosclerotic disease: Cigarette smoking,Positive family History,Obesity  Risk Factors: > 3 Risk factors or history of atherosclerotic disease*  Troponin: < 1X normal limit  Heart Score Total: 4      PHYSICAL EXAM    (up to 7 for level 4, 8 or more for level 5)     ED Triage Vitals [07/03/22 1225]   BP Temp Temp Source Heart Rate Resp SpO2 Height Weight   (!) 163/85 98.1 °F (36.7 °C) Oral 70 16 99 % 5' 8\" (1.727 m) 212 lb 15.4 oz (96.6 kg)       Physical Exam  Vitals and nursing note reviewed. Constitutional:       General: She is not in acute distress. Appearance: Normal appearance. She is not ill-appearing, toxic-appearing or diaphoretic. HENT:      Head: Normocephalic and atraumatic. No raccoon eyes, White's sign, right periorbital erythema or left periorbital erythema. Right Ear: Hearing and external ear normal.      Left Ear: Hearing and external ear normal.      Nose: Nose normal. No laceration, nasal tenderness, mucosal edema, congestion or rhinorrhea. Right Nostril: No epistaxis. Left Nostril: No epistaxis. Mouth/Throat:      Lips: Pink. No lesions. Mouth: Mucous membranes are moist.      Tongue: No lesions. Tongue does not deviate from midline. Pharynx: Oropharynx is clear. Uvula midline. No pharyngeal swelling, oropharyngeal exudate, posterior oropharyngeal erythema or uvula swelling. Tonsils: No tonsillar exudate or tonsillar abscesses. Eyes:      General: Lids are normal.         Right eye: No discharge. Left eye: No discharge. Extraocular Movements: Extraocular movements intact. Pupils: Pupils are equal, round, and reactive to light. Neck:      Trachea: Phonation normal. No abnormal tracheal secretions or tracheal deviation. Comments: No meningismus   Cardiovascular:      Rate and Rhythm: Normal rate and regular rhythm. Pulses: Normal pulses. Heart sounds: Normal heart sounds. No murmur heard. No friction rub. No gallop. Pulmonary:      Effort: Pulmonary effort is normal. No respiratory distress. Breath sounds: Normal breath sounds. No stridor. No wheezing, rhonchi or rales. Chest:      Chest wall: No tenderness. Abdominal:      General: Bowel sounds are normal. There is no distension. Palpations: Abdomen is soft. There is no mass. Tenderness: There is no abdominal tenderness. There is no right CVA tenderness, left CVA tenderness, guarding or rebound. Hernia: No hernia is present. Musculoskeletal:         General: No tenderness. Normal range of motion. Cervical back: Full passive range of motion without pain, normal range of motion and neck supple. No rigidity. No spinous process tenderness or muscular tenderness. Right lower leg: No edema. Left lower leg: No edema. Lymphadenopathy:      Cervical: No cervical adenopathy. Skin:     General: Skin is warm and dry. Capillary Refill: Capillary refill takes less than 2 seconds. Neurological:      General: No focal deficit present. Mental Status: She is alert and oriented to person, place, and time. GCS: GCS eye subscore is 4. GCS verbal subscore is 5. GCS motor subscore is 6. Cranial Nerves: No cranial nerve deficit. Sensory: Sensation is intact. No sensory deficit. Motor: Motor function is intact. No weakness. Coordination: Coordination normal.      Gait: Gait is intact. Gait normal.   Psychiatric:         Mood and Affect: Mood normal.         Behavior: Behavior normal.         DIAGNOSTIC RESULTS   LABS:    Labs Reviewed   COMPREHENSIVE METABOLIC PANEL W/ REFLEX TO MG FOR LOW K - Abnormal; Notable for the following components:       Result Value    Chloride 111 (*)     All other components within normal limits   COVID-19, RAPID   CBC WITH AUTO DIFFERENTIAL   TROPONIN   BRAIN NATRIURETIC PEPTIDE   D-DIMER, QUANTITATIVE       When ordered only abnormal lab results are displayed. All other labs were within normal range or not returned as of this dictation. EKG: When ordered, EKG's are interpreted by the Emergency Department Physician in the absence of a cardiologist.  Please see their note for interpretation of EKG.     RADIOLOGY:   Non-plain film images such as CT, Ultrasound and MRI are read by the radiologist. Plain radiographic images are visualized and preliminarily interpreted by the ED Provider with the below findings:        Interpretation per the Radiologist below, if available at the time of this note:    XR CHEST (2 VW)   Final Result   No acute cardiopulmonary disease. XR CHEST (2 VW)    Result Date: 7/3/2022  EXAMINATION: TWO XRAY VIEWS OF THE CHEST 7/3/2022 12:41 pm COMPARISON: None. HISTORY: ORDERING SYSTEM PROVIDED HISTORY: chest pain TECHNOLOGIST PROVIDED HISTORY: Reason for exam:->chest pain FINDINGS: Multiple surgical clips are in the right axilla. The cardiac silhouette, mediastinal and hilar contours are normal.  The lungs are clear. There are no pleural effusions. No acute osseous abnormalities are identified. Degenerative changes in the thoracic spine. No acute cardiopulmonary disease. PROCEDURES   Unless otherwise noted below, none     Procedures    CRITICAL CARE TIME   CRITICAL CARE NOTE:    Derek Cruz CNP am the primary clinician of record. There was a high probability of clinically significant life-threatening deterioration of the patient's condition requiring my urgent intervention. Total critical care time was at least 30 minutes. This includes vital sign monitoring, pulse oximetry monitoring, telemetry monitoring, clinical response to the IV medications, reviewing the nursing notes, consultation time, dictation/documentation time, and interpretation of the labwork. This excludes any separately billable procedures performed.     CONSULTS:  None      EMERGENCY DEPARTMENT COURSE and DIFFERENTIAL DIAGNOSIS/MDM:   Vitals:    Vitals:    07/03/22 1330 07/03/22 1345 07/03/22 1400 07/03/22 1415   BP: (!) 158/89 (!) 149/84 (!) 138/93 131/77   Pulse: 64 62 68 60   Resp: 19 17 16 12   Temp: 97.9 °F (36.6 °C)      TempSrc: Oral      SpO2: 99% 100% 96% 95%   Weight:       Height:           Patient was given the following medications:  Medications   ondansetron (ZOFRAN) injection 4 mg (has no administration in time range)   aspirin chewable tablet 324 mg (324 mg Oral Not Given 7/3/22 1403)   nitroGLYCERIN (NITROSTAT) SL tablet 0.4 mg (0.4 mg SubLINGual Given 7/3/22 1402)   nitroglycerin (NITRO-BID) 2 % ointment 0.5 inch (has no administration in time range)   acetaminophen (TYLENOL) tablet 1,000 mg (1,000 mg Oral Given 7/3/22 1353)         Is this patient to be included in the SEP-1 Core Measure due to severe sepsis or septic shock? No   Exclusion criteria - the patient is NOT to be included for SEP-1 Core Measure due to: Infection is not suspected    MDM: See HPI and above for full presentation and physical exam.  Differential diagnoses included but not limited to ACS, arrhythmia, anxiety, PE, costochondritis, pleurisy, pneumonia, musculoskeletal pain/strain, GERD, other    Blood work shows no leukocytosis. No anemia. No significant electrolyte derangements or DENIS. Rapid COVID was performed just because of her headache, it is likely secondary to the nitroglycerin. Rapid COVID was negative. EKG interpreted per myself shows a sinus rhythm with a ventricular rate of 71. QTc 434. Nonspecific T wave changes. Troponin is negative. D-dimer negative. Plain films as read by the radiologist as above. Patient's HEART score is high    Patient's chest pain was significantly alleviated with nitroglycerin sublingual.  Nitroglycerin paste ordered. Given patient's heart score, nonspecific T wave changes and nitro alleviating her pain I do believe she needs to be admitted for further evaluation and treatment. I therefore spoke to the hospitalist who agreed to admit patient and write orders for admission. FINAL IMPRESSION      1. Chest pain, unspecified type          DISPOSITION/PLAN   DISPOSITION        PATIENT REFERRED TO:  No follow-up provider specified.     DISCHARGE MEDICATIONS:  New Prescriptions    No medications on file       DISCONTINUED MEDICATIONS:  Discontinued Medications    No medications on file              (Please note that portions of this note were completed with a voice recognition program.  Efforts were made to edit the dictations but occasionally words are mis-transcribed.)    MARICRUZ Smith CNP (electronically signed)            MARICRUZ Smith CNP  07/03/22 1945 No

## 2023-05-06 NOTE — BH CONSULTATION LIAISON ASSESSMENT NOTE - DESCRIPTION
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 Drinks 1 bottle of wine most days, denies other drugs or cigarette use.

## 2023-05-06 NOTE — H&P ADULT - ASSESSMENT
43 year old female with a PMHx significant for depression and anxiety on Fluoxetine, and prior seizure (3 years ago) who was admitted from 5/3 - 5/5 for management of new onset seizures with etiology likely consistent severe EtOH withdrawal who reportedly signed out against medical advice on 5/5 returns to  for further management of ongoing symptoms of withdrawal including increased confusion. The patient denies any alcohol ingestion while home today.     43 year old female with a PMHx significant for depression and anxiety on Fluoxetine, and prior seizure (3 years ago) who was admitted from 5/3 - 5/5 for management of new onset seizures with etiology likely consistent severe EtOH withdrawal who reportedly signed out against medical advice on 5/5 returns to  for further management of ongoing symptoms of withdrawal including increased confusion. The patient denies any alcohol ingestion while home today.    #Alcohol Withdrawal Seizures  ~admit to Medicine  ~cont. seizure precautions  ~f/u w/ Neurology in the am  ~f/u EEG results   ~will hold AEDs for now  ~Lorazepam 2mg IVP prn seizure  ~cont. CIWA protocol  ~watch for DTs  ~cont. Thiamine 100mg po daily  ~cont. Folic acid 1mg po daily  ~cont. MVI 1tab po daily   ~cont. IV hydration  ~strict I/Os     #Depression and Anxiety  ~cont. Fluoxetine 60mg po daily   ~f/u w/ Psychiatry in the am    #Vte ppx  ~cont. SCDs for now

## 2023-05-06 NOTE — BH CONSULTATION LIAISON ASSESSMENT NOTE - NSBHCHARTREVIEWVS_PSY_A_CORE FT
Vital Signs Last 24 Hrs  T(C): 36.7 (06 May 2023 10:37), Max: 36.8 (06 May 2023 01:01)  T(F): 98 (06 May 2023 10:37), Max: 98.2 (06 May 2023 01:01)  HR: 63 (06 May 2023 10:37) (62 - 79)  BP: 102/89 (06 May 2023 10:37) (102/89 - 123/86)  BP(mean): 97 (06 May 2023 02:40) (97 - 106)  RR: 16 (06 May 2023 10:37) (16 - 18)  SpO2: 100% (06 May 2023 10:37) (98% - 100%)    Parameters below as of 06 May 2023 10:37  Patient On (Oxygen Delivery Method): room air

## 2023-05-06 NOTE — BH CONSULTATION LIAISON ASSESSMENT NOTE - NSBHCONSULTFOLLOWAFTERCARE_PSY_A_CORE FT
Resume follow up care with Dr. Fatima and other medical recommendations. The patient may also benefit from a support group such as AA which can be accommodated via SW referral which is already in place.

## 2023-05-06 NOTE — BH CONSULTATION LIAISON ASSESSMENT NOTE - HPI (INCLUDE ILLNESS QUALITY, SEVERITY, DURATION, TIMING, CONTEXT, MODIFYING FACTORS, ASSOCIATED SIGNS AND SYMPTOMS)
43 year old female with a h/o depression and anxiety on Fluoxetine presented to the ED yesterday after a seizure, possibly associated with alcohol withdrawal but who left the ED AMA but returned to complete assessment/management following another seizure and increasing confusion. The patient denied any alcohol ingestion yesterday prior to ED visit. She admits to drinking 1 bottle of wine most days of the week. 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 Patient reports starting treatment with a family therapist initially when she was in 10th grade because of her brother who is diagnosed with bipolar disorder then she continued individual therapy "on and off" for her own issues while growing up. Sees a psychiatrist consistently, Dr. Fatima, consistently now. Denies any other medication trials. Denies known drug allergies. Denies other drug/cigarette use. Denies s/h I/p/i, or a/v hallucinations or paranoia. Mood "okay just tired", affect neutral and congruent. Alert and oriented x4.

## 2023-05-07 LAB
ANION GAP SERPL CALC-SCNC: 7 MMOL/L — SIGNIFICANT CHANGE UP (ref 5–17)
BUN SERPL-MCNC: 4 MG/DL — LOW (ref 7–23)
CALCIUM SERPL-MCNC: 8.9 MG/DL — SIGNIFICANT CHANGE UP (ref 8.5–10.1)
CHLORIDE SERPL-SCNC: 106 MMOL/L — SIGNIFICANT CHANGE UP (ref 96–108)
CO2 SERPL-SCNC: 24 MMOL/L — SIGNIFICANT CHANGE UP (ref 22–31)
CREAT SERPL-MCNC: 0.3 MG/DL — LOW (ref 0.5–1.3)
EGFR: 136 ML/MIN/1.73M2 — SIGNIFICANT CHANGE UP
GLUCOSE SERPL-MCNC: 77 MG/DL — SIGNIFICANT CHANGE UP (ref 70–99)
HAV IGM SER-ACNC: SIGNIFICANT CHANGE UP
HBV CORE IGM SER-ACNC: SIGNIFICANT CHANGE UP
HBV SURFACE AG SER-ACNC: SIGNIFICANT CHANGE UP
HCT VFR BLD CALC: 34.8 % — SIGNIFICANT CHANGE UP (ref 34.5–45)
HCV AB S/CO SERPL IA: 0.31 S/CO — SIGNIFICANT CHANGE UP (ref 0–0.99)
HCV AB SERPL-IMP: SIGNIFICANT CHANGE UP
HGB BLD-MCNC: 11.8 G/DL — SIGNIFICANT CHANGE UP (ref 11.5–15.5)
MAGNESIUM SERPL-MCNC: 1.9 MG/DL — SIGNIFICANT CHANGE UP (ref 1.6–2.6)
MCHC RBC-ENTMCNC: 33.9 GM/DL — SIGNIFICANT CHANGE UP (ref 32–36)
MCHC RBC-ENTMCNC: 34.2 PG — HIGH (ref 27–34)
MCV RBC AUTO: 100.9 FL — HIGH (ref 80–100)
PHOSPHATE SERPL-MCNC: 4 MG/DL — SIGNIFICANT CHANGE UP (ref 2.5–4.5)
PLATELET # BLD AUTO: 132 K/UL — LOW (ref 150–400)
POTASSIUM SERPL-MCNC: 3.5 MMOL/L — SIGNIFICANT CHANGE UP (ref 3.5–5.3)
POTASSIUM SERPL-SCNC: 3.5 MMOL/L — SIGNIFICANT CHANGE UP (ref 3.5–5.3)
RBC # BLD: 3.45 M/UL — LOW (ref 3.8–5.2)
RBC # FLD: 11.8 % — SIGNIFICANT CHANGE UP (ref 10.3–14.5)
SODIUM SERPL-SCNC: 137 MMOL/L — SIGNIFICANT CHANGE UP (ref 135–145)
WBC # BLD: 4.42 K/UL — SIGNIFICANT CHANGE UP (ref 3.8–10.5)
WBC # FLD AUTO: 4.42 K/UL — SIGNIFICANT CHANGE UP (ref 3.8–10.5)

## 2023-05-07 PROCEDURE — 99232 SBSQ HOSP IP/OBS MODERATE 35: CPT | Mod: GC

## 2023-05-07 RX ADMIN — Medication 60 MILLIGRAM(S): at 15:05

## 2023-05-07 RX ADMIN — Medication 1.5 MILLIGRAM(S): at 19:48

## 2023-05-07 RX ADMIN — Medication 1.5 MILLIGRAM(S): at 23:10

## 2023-05-07 RX ADMIN — Medication 1.5 MILLIGRAM(S): at 15:06

## 2023-05-07 RX ADMIN — Medication 1 MILLIGRAM(S): at 10:35

## 2023-05-07 RX ADMIN — Medication 100 MILLIGRAM(S): at 10:35

## 2023-05-07 RX ADMIN — Medication 1 TABLET(S): at 10:34

## 2023-05-07 RX ADMIN — Medication 1.5 MILLIGRAM(S): at 10:35

## 2023-05-07 RX ADMIN — Medication 1.5 MILLIGRAM(S): at 05:26

## 2023-05-07 RX ADMIN — Medication 1.5 MILLIGRAM(S): at 01:39

## 2023-05-07 NOTE — PROGRESS NOTE ADULT - ATTENDING COMMENTS
3 year old female with a PMHx significant for depression and anxiety on Fluoxetine, and prior seizure (3 years ago) who was admitted from 5/3 - 5/5 for management of new onset seizures with etiology likely consistent severe EtOH withdrawal who reportedly signed out against medical advice on 5/5 returns to  for further management of ongoing symptoms of withdrawal including increased confusion. The patient denies any alcohol ingestion while home today.    #Alcohol Withdrawal Seizures  on ativan taper   - cont. seizure precautions  - Neurology recs appreciated MRI deferred outpt, Pt to speak with  to discuss if she wants it done  - EEG negative  - will hold AEDs for now      #Transaminitis   - ,   - hepatitis panel: Negative  - continue to trend    #Depression and Anxiety  - cont. Fluoxetine 60mg po daily   - f/u Psychiatry consult    #Vte ppx  ~cont. SCDs for now

## 2023-05-08 ENCOUNTER — TRANSCRIPTION ENCOUNTER (OUTPATIENT)
Age: 43
End: 2023-05-08

## 2023-05-08 LAB
ALBUMIN SERPL ELPH-MCNC: 3.4 G/DL — SIGNIFICANT CHANGE UP (ref 3.3–5)
ALP SERPL-CCNC: 56 U/L — SIGNIFICANT CHANGE UP (ref 40–120)
ALT FLD-CCNC: 135 U/L — HIGH (ref 12–78)
ANION GAP SERPL CALC-SCNC: 8 MMOL/L — SIGNIFICANT CHANGE UP (ref 5–17)
AST SERPL-CCNC: 138 U/L — HIGH (ref 15–37)
BASOPHILS # BLD AUTO: 0.05 K/UL — SIGNIFICANT CHANGE UP (ref 0–0.2)
BASOPHILS NFR BLD AUTO: 1.2 % — SIGNIFICANT CHANGE UP (ref 0–2)
BILIRUB SERPL-MCNC: 0.9 MG/DL — SIGNIFICANT CHANGE UP (ref 0.2–1.2)
BUN SERPL-MCNC: 4 MG/DL — LOW (ref 7–23)
CALCIUM SERPL-MCNC: 9.3 MG/DL — SIGNIFICANT CHANGE UP (ref 8.5–10.1)
CHLORIDE SERPL-SCNC: 104 MMOL/L — SIGNIFICANT CHANGE UP (ref 96–108)
CO2 SERPL-SCNC: 25 MMOL/L — SIGNIFICANT CHANGE UP (ref 22–31)
CREAT SERPL-MCNC: 0.37 MG/DL — LOW (ref 0.5–1.3)
EGFR: 129 ML/MIN/1.73M2 — SIGNIFICANT CHANGE UP
EOSINOPHIL # BLD AUTO: 0.09 K/UL — SIGNIFICANT CHANGE UP (ref 0–0.5)
EOSINOPHIL NFR BLD AUTO: 2.1 % — SIGNIFICANT CHANGE UP (ref 0–6)
GLUCOSE SERPL-MCNC: 82 MG/DL — SIGNIFICANT CHANGE UP (ref 70–99)
HCT VFR BLD CALC: 34.4 % — LOW (ref 34.5–45)
HGB BLD-MCNC: 12 G/DL — SIGNIFICANT CHANGE UP (ref 11.5–15.5)
IMM GRANULOCYTES NFR BLD AUTO: 0.7 % — SIGNIFICANT CHANGE UP (ref 0–0.9)
LYMPHOCYTES # BLD AUTO: 1.05 K/UL — SIGNIFICANT CHANGE UP (ref 1–3.3)
LYMPHOCYTES # BLD AUTO: 24.5 % — SIGNIFICANT CHANGE UP (ref 13–44)
MAGNESIUM SERPL-MCNC: 1.8 MG/DL — SIGNIFICANT CHANGE UP (ref 1.6–2.6)
MCHC RBC-ENTMCNC: 34.9 GM/DL — SIGNIFICANT CHANGE UP (ref 32–36)
MCHC RBC-ENTMCNC: 35.1 PG — HIGH (ref 27–34)
MCV RBC AUTO: 100.6 FL — HIGH (ref 80–100)
MONOCYTES # BLD AUTO: 0.42 K/UL — SIGNIFICANT CHANGE UP (ref 0–0.9)
MONOCYTES NFR BLD AUTO: 9.8 % — SIGNIFICANT CHANGE UP (ref 2–14)
NEUTROPHILS # BLD AUTO: 2.64 K/UL — SIGNIFICANT CHANGE UP (ref 1.8–7.4)
NEUTROPHILS NFR BLD AUTO: 61.7 % — SIGNIFICANT CHANGE UP (ref 43–77)
PHOSPHATE SERPL-MCNC: 3.9 MG/DL — SIGNIFICANT CHANGE UP (ref 2.5–4.5)
PLATELET # BLD AUTO: 143 K/UL — LOW (ref 150–400)
POTASSIUM SERPL-MCNC: 3.6 MMOL/L — SIGNIFICANT CHANGE UP (ref 3.5–5.3)
POTASSIUM SERPL-SCNC: 3.6 MMOL/L — SIGNIFICANT CHANGE UP (ref 3.5–5.3)
PROT SERPL-MCNC: 6.7 GM/DL — SIGNIFICANT CHANGE UP (ref 6–8.3)
RBC # BLD: 3.42 M/UL — LOW (ref 3.8–5.2)
RBC # FLD: 11.9 % — SIGNIFICANT CHANGE UP (ref 10.3–14.5)
SODIUM SERPL-SCNC: 137 MMOL/L — SIGNIFICANT CHANGE UP (ref 135–145)
WBC # BLD: 4.28 K/UL — SIGNIFICANT CHANGE UP (ref 3.8–10.5)
WBC # FLD AUTO: 4.28 K/UL — SIGNIFICANT CHANGE UP (ref 3.8–10.5)

## 2023-05-08 PROCEDURE — 99232 SBSQ HOSP IP/OBS MODERATE 35: CPT | Mod: GC

## 2023-05-08 RX ADMIN — Medication 1 MILLIGRAM(S): at 02:45

## 2023-05-08 RX ADMIN — Medication 100 MILLIGRAM(S): at 10:01

## 2023-05-08 RX ADMIN — Medication 1 MILLIGRAM(S): at 06:22

## 2023-05-08 RX ADMIN — Medication 60 MILLIGRAM(S): at 10:01

## 2023-05-08 RX ADMIN — Medication 1 MILLIGRAM(S): at 10:00

## 2023-05-08 RX ADMIN — Medication 1 TABLET(S): at 10:00

## 2023-05-08 RX ADMIN — Medication 1 MILLIGRAM(S): at 17:47

## 2023-05-08 RX ADMIN — Medication 1 MILLIGRAM(S): at 13:33

## 2023-05-08 RX ADMIN — Medication 1 MILLIGRAM(S): at 21:58

## 2023-05-08 NOTE — DISCHARGE NOTE PROVIDER - ATTENDING DISCHARGE PHYSICAL EXAMINATION:
43 year old female with a PMHx significant for depression and anxiety on Fluoxetine, and prior seizure (3 years ago) who was admitted from 5/3 - 5/5 for management of new onset seizures with etiology likely consistent severe EtOH withdrawal who reportedly signed out against medical advice on 5/5 returns to  for further management of ongoing symptoms of withdrawal including increased confusion. The patient denies any alcohol ingestion while home today.    #Alcohol Withdrawal Seizures   completed ativan taper  f/u PCP /neurology for outpt MRI brain    - cont. Folic acid 1mg po daily  - cont. MVI 1tab po daily     - Patient medically stable and optimized for d/c today.  Outpatient rehab information provided to the patient.     #Transaminitis likely related to chronic etoh use  - ,   - hepatitis panel: Negative  - Will need outpatient workup for further management and treatment.     #Depression and Anxiety  - cont. Fluoxetine 60mg po daily   - f/u Psychiatry consult outpt    medically stable for discharge

## 2023-05-08 NOTE — DISCHARGE NOTE PROVIDER - CARE PROVIDER_API CALL
Bryanna Puentes)  Internal Medicine  [address] [place] [address]  Phone: [phone]  Fax: [phone]  Follow Up Time:

## 2023-05-08 NOTE — DISCHARGE NOTE PROVIDER - HOSPITAL COURSE
HPI:  43 year old female with a PMHx significant for depression and anxiety on Fluoxetine, and prior seizure (3 years ago) who was admitted from 5/3 - 5/5 for management of new onset seizures with etiology likely consistent severe EtOH withdrawal who reportedly signed out against medical advice on 5/5 returns to  for further management of ongoing symptoms of withdrawal including increased confusion. In the ED VSS. Pt was admitted to [facility] for further management. Pt was placed on CIWA protocol for alcohol withdrawal and finished an ativan taper. Pt continued to show signs of clinical improvement and rehab for alcohol. Pt is medically stable for discharge at this time,    (06 May 2023 01:02)    SUBJECTIVE: 5/8: Pt seen and examined at bedside. Pt has no acute complaints at this time.        Vital Signs Last 24 Hrs  T(C): 37 (08 May 2023 15:33), Max: 37 (08 May 2023 02:47)  T(F): 98.6 (08 May 2023 15:33), Max: 98.6 (08 May 2023 02:47)  HR: 78 (08 May 2023 15:33) (60 - 80)  BP: 96/78 (08 May 2023 15:33) (96/78 - 127/87)  BP(mean): 98 (08 May 2023 13:50) (98 - 98)  RR: 18 (08 May 2023 15:33) (16 - 18)  SpO2: 97% (08 May 2023 15:33) (95% - 100%)    Parameters below as of 08 May 2023 15:33  Patient On (Oxygen Delivery Method): room air          PHYSICAL EXAM:  Constitutional: NAD, awake and alert, well-developed  HEENT: PERR, EOMI, Normal Hearing, MMM  Neck: Soft and supple, No LAD, No JVD  Respiratory: Breath sounds are clear bilaterally, No wheezing, rales or rhonchi  Cardiovascular: S1 and S2, regular rate and rhythm, no Murmurs, gallops or rubs  Gastrointestinal: Bowel Sounds present, soft, nontender, nondistended, no guarding, no rebound  Extremities: No peripheral edema  Vascular: 2+ peripheral pulses  Neurological: A/O x 3, no focal deficits  Musculoskeletal: 5/5 strength b/l upper and lower extremities  Skin: No rashes

## 2023-05-08 NOTE — SBIRT NOTE ADULT - NSSBIRTALCPOSREINDET_GEN_A_CORE
Pt has a private therapist Dr Fatima whom she sees once a week. Addiction folder provided for referral to outpt ETOH program.

## 2023-05-08 NOTE — DISCHARGE NOTE PROVIDER - NSDCMRMEDTOKEN_GEN_ALL_CORE_FT
FLUoxetine 20 mg oral capsule: 1 cap(s) orally once a day after a meal ***take with 40mg = 60mg***  FLUoxetine 40 mg oral capsule: 1 cap(s) orally once a day after a meal  ***take with 20mg = 60mg***  LORazepam 0.5 mg oral tablet: 1 tab(s) orally once a day as needed for  anxiety

## 2023-05-08 NOTE — DISCHARGE NOTE PROVIDER - NSDCCPCAREPLAN_GEN_ALL_CORE_FT
PRINCIPAL DISCHARGE DIAGNOSIS  Diagnosis: History of seizure  Assessment and Plan of Treatment: -Please seek medical attention if seizure lasts > 2 minutes, loss of consciousness, altered mental status, persistent headache or lethargy, change in seizure activity or any new or worsening medical conditions  -Activities such as swimming, bathing, outdoor activities, and sports should be done under supervision      SECONDARY DISCHARGE DIAGNOSES  Diagnosis: Anxiety and depression  Assessment and Plan of Treatment:     Diagnosis: Change in mental status  Assessment and Plan of Treatment:      PRINCIPAL DISCHARGE DIAGNOSIS  Diagnosis: History of seizure  Assessment and Plan of Treatment: -Please seek medical attention if seizure lasts > 2 minutes, loss of consciousness, altered mental status, persistent headache or lethargy, change in seizure activity or any new or worsening medical conditions  -Activities such as swimming, bathing, outdoor activities, and sports should be done under supervision  please follow up with your doctor - monitoring  abnormal liver tests likely from alcohol use   abstaining  from alcohol use is strongly advised      SECONDARY DISCHARGE DIAGNOSES  Diagnosis: Change in mental status  Assessment and Plan of Treatment:     Diagnosis: Anxiety and depression  Assessment and Plan of Treatment:

## 2023-05-08 NOTE — DISCHARGE NOTE PROVIDER - NSDCCAREPROVSEEN_GEN_ALL_CORE_FT
Curt, Gracie Rodriguez, Oracio Crocker, Simeon Shook, Tyson Alvarado, Virginia Muñoz, Shane Haji, Juanjo

## 2023-05-09 ENCOUNTER — TRANSCRIPTION ENCOUNTER (OUTPATIENT)
Age: 43
End: 2023-05-09

## 2023-05-09 VITALS
OXYGEN SATURATION: 99 % | DIASTOLIC BLOOD PRESSURE: 81 MMHG | RESPIRATION RATE: 18 BRPM | TEMPERATURE: 98 F | HEART RATE: 63 BPM | SYSTOLIC BLOOD PRESSURE: 110 MMHG

## 2023-05-09 DIAGNOSIS — F32.A DEPRESSION, UNSPECIFIED: ICD-10-CM

## 2023-05-09 DIAGNOSIS — F41.9 ANXIETY DISORDER, UNSPECIFIED: ICD-10-CM

## 2023-05-09 DIAGNOSIS — F10.239 ALCOHOL DEPENDENCE WITH WITHDRAWAL, UNSPECIFIED: ICD-10-CM

## 2023-05-09 DIAGNOSIS — E87.1 HYPO-OSMOLALITY AND HYPONATREMIA: ICD-10-CM

## 2023-05-09 DIAGNOSIS — G40.89 OTHER SEIZURES: ICD-10-CM
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
## 2023-05-09 DIAGNOSIS — Z53.29 PROCEDURE AND TREATMENT NOT CARRIED OUT BECAUSE OF PATIENT'S DECISION FOR OTHER REASONS: ICD-10-CM

## 2023-05-09 PROCEDURE — 99239 HOSP IP/OBS DSCHRG MGMT >30: CPT | Mod: GC

## 2023-05-09 RX ORDER — THIAMINE MONONITRATE (VIT B1) 100 MG
1 TABLET ORAL
Qty: 14 | Refills: 0
Start: 2023-05-09 | End: 2023-05-22

## 2023-05-09 RX ORDER — FOLIC ACID 0.8 MG
1 TABLET ORAL
Qty: 14 | Refills: 0
Start: 2023-05-09 | End: 2023-05-22

## 2023-05-09 RX ADMIN — Medication 0.5 MILLIGRAM(S): at 02:21

## 2023-05-09 RX ADMIN — Medication 1 MILLIGRAM(S): at 10:50

## 2023-05-09 RX ADMIN — Medication 0.5 MILLIGRAM(S): at 06:07

## 2023-05-09 RX ADMIN — Medication 0.5 MILLIGRAM(S): at 10:50

## 2023-05-09 RX ADMIN — Medication 1 TABLET(S): at 10:50

## 2023-05-09 RX ADMIN — Medication 60 MILLIGRAM(S): at 10:50

## 2023-05-09 RX ADMIN — Medication 0.5 MILLIGRAM(S): at 14:08

## 2023-05-09 NOTE — PROGRESS NOTE ADULT - ASSESSMENT
43 year old female with a PMHx significant for depression and anxiety on Fluoxetine, and prior seizure (3 years ago) who was admitted from 5/3 - 5/5 for management of new onset seizures with etiology likely consistent severe EtOH withdrawal who reportedly signed out against medical advice on 5/5 returns to  for further management of ongoing symptoms of withdrawal including increased confusion. The patient denies any alcohol ingestion while home today.    [] Alcohol Withdrawal Seizures  - admit to Medicine  - cont. seizure precautions  - Neurology recs appreciated MRI deferred outpt, Pt to speak with  to discuss if she wants it done  - EEG negative  - will hold AEDs for now  - Lorazepam 2mg IVP prn seizure  - cont. CIWA protocol  - watch for DTs  - cont. Thiamine 100mg po daily  - cont. Folic acid 1mg po daily  - cont. MVI 1tab po daily   - cont. IV hydration  - strict I/Os     [] Depression and Anxiety  - cont. Fluoxetine 60mg po daily   - f/u Psychiatry consult    [] Vte ppx  ~cont. SCDs for now     - d/w Dr. Muñoz  
43 year old female with a PMHx significant for depression and anxiety on Fluoxetine, and prior seizure (3 years ago) who was admitted from 5/3 - 5/5 for management of new onset seizures with etiology likely consistent severe EtOH withdrawal who reportedly signed out against medical advice on 5/5 returns to  for further management of ongoing symptoms of withdrawal including increased confusion. The patient denies any alcohol ingestion while home today.    #Alcohol Withdrawal Seizures  - cont. seizure precautions  - Neurology recs appreciated MRI deferred outpt, Pt to speak with  to discuss if she wants it done  - EEG negative  - will hold AEDs for now  - Lorazepam 2mg IVP prn seizure  - cont. CIWA protocol  - watch for DTs  - cont. Thiamine 100mg po daily  - cont. Folic acid 1mg po daily  - cont. MVI 1tab po daily   - cont. IV hydration  - strict I/Os     #Transaminitis   - ,   - hepatitis panel: Negative  - continue to trend    #Depression and Anxiety  - cont. Fluoxetine 60mg po daily   - f/u Psychiatry consult    #Vte ppx  ~cont. SCDs for now     Dispo: complete ativan taper then d/c home tomorrow    *Case discussed with Dr. Muñoz    
43 year old female with a PMHx significant for depression and anxiety on Fluoxetine, and prior seizure (3 years ago) who was admitted from 5/3 - 5/5 for management of new onset seizures with etiology likely consistent severe EtOH withdrawal who reportedly signed out against medical advice on 5/5 returns to  for further management of ongoing symptoms of withdrawal including increased confusion. The patient denies any alcohol ingestion while home today.    #Alcohol Withdrawal Seizures  - cont. seizure precautions  - Neurology recs appreciated MRI deferred outpt, Pt to speak with  to discuss if she wants it done  - EEG negative  - will hold AEDs for now  - Lorazepam 2mg IVP prn seizure  - cont. CIWA protocol  - watch for DTs  - cont. Thiamine 100mg po daily  - cont. Folic acid 1mg po daily  - cont. MVI 1tab po daily   - cont. IV hydration  - strict I/Os   - Patient medically stable and optimized for d/c today.  Outpatient rehab information provided to the patient.     #Transaminitis   - ,   - hepatitis panel: Negative  - Will need outpatient workup for further management and treatment.     #Depression and Anxiety  - cont. Fluoxetine 60mg po daily   - f/u Psychiatry consult    #Vte ppx  ~cont. SCDs for now     Dispo: d/c home after 2pm dose.     *Case discussed with Dr. Muñoz    
43 year old female with a PMHx significant for depression and anxiety on Fluoxetine, and prior seizure (3 years ago) who was admitted from 5/3 - 5/5 for management of new onset seizures with etiology likely consistent severe EtOH withdrawal who reportedly signed out against medical advice on 5/5 returns to  for further management of ongoing symptoms of withdrawal including increased confusion. The patient denies any alcohol ingestion while home today.    #Alcohol Withdrawal Seizures  - cont. seizure precautions  - Neurology recs appreciated MRI deferred outpt, Pt to speak with  to discuss if she wants it done  - EEG negative  - will hold AEDs for now  - Lorazepam 2mg IVP prn seizure  - cont. CIWA protocol  - watch for DTs  - cont. Thiamine 100mg po daily  - cont. Folic acid 1mg po daily  - cont. MVI 1tab po daily   - cont. IV hydration  - strict I/Os     #Transaminitis   - ,   - hepatitis panel: Negative  - continue to trend    #Depression and Anxiety  - cont. Fluoxetine 60mg po daily   - f/u Psychiatry consult    #Vte ppx  ~cont. SCDs for now     *Case discussed with Dr. Muñoz

## 2023-05-09 NOTE — DISCHARGE NOTE NURSING/CASE MANAGEMENT/SOCIAL WORK - PATIENT PORTAL LINK FT
You can access the FollowMyHealth Patient Portal offered by [facility] by registering at the following website: [email or web address]. By joining [email or web address]’s FollowMyHealth portal, you will also be able to view your health information using other applications (apps) compatible with our system.

## 2023-05-09 NOTE — PROGRESS NOTE ADULT - SUBJECTIVE AND OBJECTIVE BOX
HPI:  43 year old female with a PMHx significant for depression and anxiety on Fluoxetine, and prior seizure (3 years ago) who was admitted from 5/3 - 5/5 for management of new onset seizures with etiology likely consistent severe EtOH withdrawal who reportedly signed out against medical advice on 5/5 returns to  for further management of ongoing symptoms of withdrawal including increased confusion. The patient denies any alcohol ingestion while home today.   (06 May 2023 01:02)      SUBJECTIVE:   Patient seen and Examined at bedside. Pt has no acute complaints at this time.  Information for outpatient rehab center provided, patient not willing to go at this time, but will continue to consider joining a rehab group.   Plan for d/c today after 2pm dose discussed with patient.       Vital Signs Last 24 Hrs  T(C): 36.7 (09 May 2023 10:30), Max: 37 (08 May 2023 15:33)  T(F): 98.1 (09 May 2023 10:30), Max: 98.6 (08 May 2023 15:33)  HR: 69 (09 May 2023 10:30) (68 - 78)  BP: 102/64 (09 May 2023 10:30) (94/65 - 127/87)  BP(mean): 98 (08 May 2023 13:50) (98 - 98)  RR: 18 (09 May 2023 10:30) (17 - 18)  SpO2: 99% (09 May 2023 10:30) (95% - 100%)    Parameters below as of 09 May 2023 10:30  Patient On (Oxygen Delivery Method): room air      PHYSICAL EXAM:  Constitutional: NAD, awake and alert, well-developed  HEENT: PERR, EOMI, Normal Hearing, MMM  Neck: Soft and supple, No LAD, No JVD  Respiratory: Breath sounds are clear bilaterally, No wheezing, rales or rhonchi  Cardiovascular: S1 and S2, regular rate and rhythm, no Murmurs, gallops or rubs  Gastrointestinal: Bowel Sounds present, soft, nontender, nondistended, no guarding, no rebound  Extremities: No peripheral edema  Vascular: 2+ peripheral pulses  Neurological: A/O x 3, no focal deficits  Musculoskeletal: 5/5 strength b/l upper and lower extremities  Skin: No rashes    MEDICATIONS:  MEDICATIONS  (STANDING):  FLUoxetine 60 milliGRAM(s) Oral daily  folic acid 1 milliGRAM(s) Oral daily  LORazepam     Tablet   Oral   LORazepam     Tablet 0.5 milliGRAM(s) Oral every 4 hours  multivitamin 1 Tablet(s) Oral daily    MEDICATIONS  (PRN):  LORazepam   Injectable 2 milliGRAM(s) IV Push every 2 hours PRN CIWA-Ar score increase by 2 points and a total score of 7 or less  LORazepam   Injectable 2 milliGRAM(s) IV Push every 1 hour PRN CIWA-Ar score 8 or greater      LABS: All Labs Reviewed:                         12.0   4.28  )-----------( 143      ( 08 May 2023 05:59 )             34.4     05-08    137  |  104  |  4<L>  ----------------------------<  82  3.6   |  25  |  0.37<L>    Ca    9.3      08 May 2023 05:59  Phos  3.9     05-08  Mg     1.8     05-08    TPro  6.7  /  Alb  3.4  /  TBili  0.9  /  DBili  x   /  AST  138<H>  /  ALT  135<H>  /  AlkPhos  56  05-08      RADIOLOGY/EKG:  < from: CT Head No Cont (05.03.23 @ 22:35) >  IMPRESSION:  No acute intracranial hemorrhage, mass effect, or midline shift.  No significant change compared with 5/14/2020.      < end of copied text >  < from: Xray Chest 1 View- PORTABLE-Urgent (Xray Chest 1 View- PORTABLE-Urgent .) (05.03.23 @ 23:05) >  Impression:    No acute pulmonary disease.    < end of copied text >  
HPI:  43 year old female with a PMHx significant for depression and anxiety on Fluoxetine, and prior seizure (3 years ago) who was admitted from 5/3 - 5/5 for management of new onset seizures with etiology likely consistent severe EtOH withdrawal who reportedly signed out against medical advice on 5/5 returns to  for further management of ongoing symptoms of withdrawal including increased confusion. The patient denies any alcohol ingestion while home today.   (06 May 2023 01:02)      SUBJECTIVE:   5/6: Patient seen and Examined at bedside. Pt has no acute complaints at this time        Vital Signs Last 24 Hrs  T(C): 36.7 (06 May 2023 10:37), Max: 36.8 (06 May 2023 01:01)  T(F): 98 (06 May 2023 10:37), Max: 98.2 (06 May 2023 01:01)  HR: 63 (06 May 2023 10:37) (62 - 80)  BP: 102/89 (06 May 2023 10:37) (102/89 - 123/86)  BP(mean): 97 (06 May 2023 02:40) (95 - 106)  RR: 16 (06 May 2023 10:37) (16 - 18)  SpO2: 100% (06 May 2023 10:37) (98% - 100%)    Parameters below as of 06 May 2023 10:37  Patient On (Oxygen Delivery Method): room air        PHYSICAL EXAM:  Constitutional: NAD, awake and alert, well-developed  HEENT: PERR, EOMI, Normal Hearing, MMM  Neck: Soft and supple, No LAD, No JVD  Respiratory: Breath sounds are clear bilaterally, No wheezing, rales or rhonchi  Cardiovascular: S1 and S2, regular rate and rhythm, no Murmurs, gallops or rubs  Gastrointestinal: Bowel Sounds present, soft, nontender, nondistended, no guarding, no rebound  Extremities: No peripheral edema  Vascular: 2+ peripheral pulses  Neurological: A/O x 3, no focal deficits  Musculoskeletal: 5/5 strength b/l upper and lower extremities  Skin: No rashes    MEDICATIONS:  MEDICATIONS  (STANDING):  FLUoxetine 60 milliGRAM(s) Oral daily  folic acid 1 milliGRAM(s) Oral daily  LORazepam     Tablet   Oral   LORazepam     Tablet 2 milliGRAM(s) Oral every 4 hours  multivitamin 1 Tablet(s) Oral daily  thiamine 100 milliGRAM(s) Oral daily      LABS: All Labs Reviewed:                        11.3   3.82  )-----------( 116      ( 06 May 2023 07:00 )             32.5     05-06    137  |  105  |  5<L>  ----------------------------<  97  3.5   |  23  |  0.29<L>    Ca    8.6      06 May 2023 07:00  Phos  3.7     05-06  Mg     2.3     05-06    TPro  6.3  /  Alb  3.2<L>  /  TBili  1.3<H>  /  DBili  0.4<H>  /  AST  137<H>  /  ALT  122<H>  /  AlkPhos  54  05-06    PT/INR - ( 05 May 2023 17:24 )   PT: 12.8 sec;   INR: 1.10 ratio         PTT - ( 05 May 2023 17:24 )  PTT:26.4 sec      Blood Culture:     RADIOLOGY/EKG:  
HPI:  43 year old female with a PMHx significant for depression and anxiety on Fluoxetine, and prior seizure (3 years ago) who was admitted from 5/3 - 5/5 for management of new onset seizures with etiology likely consistent severe EtOH withdrawal who reportedly signed out against medical advice on 5/5 returns to  for further management of ongoing symptoms of withdrawal including increased confusion. The patient denies any alcohol ingestion while home today.   (06 May 2023 01:02)      SUBJECTIVE:   5/6: Patient seen and Examined at bedside. Pt has no acute complaints at this time  5/7: Patient seen and examined at bedside.  No acute complaints and no overnight events.  Hospital course discussed with patient.     Vital Signs Last 24 Hrs  T(C): 36.4 (07 May 2023 10:37), Max: 36.8 (07 May 2023 04:35)  T(F): 97.5 (07 May 2023 10:37), Max: 98.2 (07 May 2023 04:35)  HR: 78 (07 May 2023 10:37) (62 - 78)  BP: 111/69 (07 May 2023 10:37) (99/72 - 116/82)  RR: 18 (07 May 2023 10:37) (14 - 18)  SpO2: 100% (07 May 2023 10:37) (98% - 100%)    Parameters below as of 07 May 2023 10:37  Patient On (Oxygen Delivery Method): room air    PHYSICAL EXAM:  Constitutional: NAD, awake and alert, well-developed  HEENT: PERR, EOMI, Normal Hearing, MMM  Neck: Soft and supple, No LAD, No JVD  Respiratory: Breath sounds are clear bilaterally, No wheezing, rales or rhonchi  Cardiovascular: S1 and S2, regular rate and rhythm, no Murmurs, gallops or rubs  Gastrointestinal: Bowel Sounds present, soft, nontender, nondistended, no guarding, no rebound  Extremities: No peripheral edema  Vascular: 2+ peripheral pulses  Neurological: A/O x 3, no focal deficits  Musculoskeletal: 5/5 strength b/l upper and lower extremities  Skin: No rashes    MEDICATIONS:  MEDICATIONS  (STANDING):  FLUoxetine 60 milliGRAM(s) Oral daily  folic acid 1 milliGRAM(s) Oral daily  LORazepam     Tablet   Oral   LORazepam     Tablet 1.5 milliGRAM(s) Oral every 4 hours  multivitamin 1 Tablet(s) Oral daily  thiamine 100 milliGRAM(s) Oral daily    MEDICATIONS  (PRN):  LORazepam     Tablet 2 milliGRAM(s) Oral every 2 hours PRN Symptom-triggered 2 point increase in CIWA-Ar  LORazepam     Tablet 2 milliGRAM(s) Oral every 1 hour PRN Symptom-triggered: each CIWA -Ar score 8 or GREATER  LORazepam   Injectable 2 milliGRAM(s) IV Push every 1 hour PRN CIWA-Ar score 8 or greater  LORazepam   Injectable 2 milliGRAM(s) IV Push every 2 hours PRN CIWA-Ar score increase by 2 points and a total score of 7 or less    LABS: All Labs Reviewed:                        11.8   4.42  )-----------( 132      ( 07 May 2023 07:37 )             34.8     05-07    137  |  106  |  4<L>  ----------------------------<  77  3.5   |  24  |  0.30<L>    Ca    8.9      07 May 2023 07:37  Phos  4.0     05-07  Mg     1.9     05-07    TPro  6.3  /  Alb  3.2<L>  /  TBili  1.3<H>  /  DBili  0.4<H>  /  AST  137<H>  /  ALT  122<H>  /  AlkPhos  54  05-06    
HPI:  43 year old female with a PMHx significant for depression and anxiety on Fluoxetine, and prior seizure (3 years ago) who was admitted from 5/3 - 5/5 for management of new onset seizures with etiology likely consistent severe EtOH withdrawal who reportedly signed out against medical advice on 5/5 returns to  for further management of ongoing symptoms of withdrawal including increased confusion. The patient denies any alcohol ingestion while home today.   (06 May 2023 01:02)      SUBJECTIVE:   5/8: Patient seen and Examined at bedside. Pt has no acute complaints at this time.       Vital Signs Last 24 Hrs  T(C): 36.7 (08 May 2023 13:50), Max: 37 (08 May 2023 02:47)  T(F): 98.1 (08 May 2023 13:50), Max: 98.6 (08 May 2023 02:47)  HR: 68 (08 May 2023 13:50) (60 - 80)  BP: 127/87 (08 May 2023 13:50) (99/71 - 127/87)  BP(mean): 98 (08 May 2023 13:50) (98 - 98)  RR: 18 (08 May 2023 13:50) (16 - 18)  SpO2: 95% (08 May 2023 13:50) (95% - 100%)    Parameters below as of 08 May 2023 13:50  Patient On (Oxygen Delivery Method): room air        PHYSICAL EXAM:  Constitutional: NAD, awake and alert, well-developed  HEENT: PERR, EOMI, Normal Hearing, MMM  Neck: Soft and supple, No LAD, No JVD  Respiratory: Breath sounds are clear bilaterally, No wheezing, rales or rhonchi  Cardiovascular: S1 and S2, regular rate and rhythm, no Murmurs, gallops or rubs  Gastrointestinal: Bowel Sounds present, soft, nontender, nondistended, no guarding, no rebound  Extremities: No peripheral edema  Vascular: 2+ peripheral pulses  Neurological: A/O x 3, no focal deficits  Musculoskeletal: 5/5 strength b/l upper and lower extremities  Skin: No rashes    MEDICATIONS:  MEDICATIONS  (STANDING):  FLUoxetine 60 milliGRAM(s) Oral daily  folic acid 1 milliGRAM(s) Oral daily  LORazepam     Tablet 1 milliGRAM(s) Oral every 4 hours  LORazepam     Tablet   Oral   multivitamin 1 Tablet(s) Oral daily      LABS: All Labs Reviewed:                        12.0   4.28  )-----------( 143      ( 08 May 2023 05:59 )             34.4     05-08    137  |  104  |  4<L>  ----------------------------<  82  3.6   |  25  |  0.37<L>    Ca    9.3      08 May 2023 05:59  Phos  3.9     05-08  Mg     1.8     05-08    TPro  6.7  /  Alb  3.4  /  TBili  0.9  /  DBili  x   /  AST  138<H>  /  ALT  135<H>  /  AlkPhos  56  05-08          Blood Culture:     RADIOLOGY/EKG:  < from: CT Head No Cont (05.03.23 @ 22:35) >  IMPRESSION:  No acute intracranial hemorrhage, mass effect, or midline shift.  No significant change compared with 5/14/2020.      < end of copied text >  < from: Xray Chest 1 View- PORTABLE-Urgent (Xray Chest 1 View- PORTABLE-Urgent .) (05.03.23 @ 23:05) >  Impression:    No acute pulmonary disease.    < end of copied text >

## 2023-05-09 NOTE — PROGRESS NOTE ADULT - REASON FOR ADMISSION
Severe Anxiety/Withdrawal Symptoms, Seizures
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
## 2023-05-12 DIAGNOSIS — F10.239 ALCOHOL DEPENDENCE WITH WITHDRAWAL, UNSPECIFIED: ICD-10-CM

## 2023-05-12 DIAGNOSIS — F32.A DEPRESSION, UNSPECIFIED: ICD-10-CM

## 2023-05-12 DIAGNOSIS — R56.9 UNSPECIFIED CONVULSIONS: ICD-10-CM

## 2023-05-12 DIAGNOSIS — F41.9 ANXIETY DISORDER, UNSPECIFIED: ICD-10-CM

## 2023-08-18 NOTE — H&P ADULT - BIRTH SEX
-- DO NOT REPLY / DO NOT REPLY ALL --  -- Message is from Engagement Center Operations (ECO) --    General Patient Message: Patient calling regarding requesting to switch the Pharmacy from [facility] to [facility] for the medication Terbinafine that was just prescribed . Please advise.     Caller Information       Type Contact Phone/Fax    08/18/2023 10:07 AM CDT Phone (Incoming) Schoen, Lisa (Self) [phone] (M)        Alternative phone number: [phone]    Can a detailed message be left? Yes    Message Turnaround: [place]:    Refer to site's KB page for routing instructions    Please give this turnaround time to the caller:   \"You can expect to receive a response 1-3 business days after your provider's clinical team reviews the message\"              
Rx resent to [facility] per pt request.  
Female

## 2023-11-15 NOTE — ED PROVIDER NOTE - CARDIAC, MLM
Patient presents to the ED by [facility] cot with complaints of 2 episodes of rectal bleeding and dizziness. Patient states the rectal bleeding was started this morning. Patient denies abdominal pain. Reports hx of hemorrhoids that were surgically removed, denies any pain or apparent hemorrhoids. Patient hyperventilating on arrival.  
Normal rate, regular rhythm.  Heart sounds S1, S2.  No rubs or gallops.

## 2024-07-12 ENCOUNTER — OUTPATIENT (OUTPATIENT)
Dept: OUTPATIENT SERVICES | Facility: HOSPITAL | Age: 44
LOS: 1 days | End: 2024-07-12
Payer: COMMERCIAL

## 2024-07-12 ENCOUNTER — APPOINTMENT (OUTPATIENT)
Dept: ULTRASOUND IMAGING | Facility: IMAGING CENTER | Age: 44
End: 2024-07-12
Payer: COMMERCIAL

## 2024-07-12 ENCOUNTER — APPOINTMENT (OUTPATIENT)
Dept: MAMMOGRAPHY | Facility: IMAGING CENTER | Age: 44
End: 2024-07-12
Payer: COMMERCIAL

## 2024-07-12 DIAGNOSIS — Z98.891 HISTORY OF UTERINE SCAR FROM PREVIOUS SURGERY: Chronic | ICD-10-CM

## 2024-07-12 DIAGNOSIS — N63.0 UNSPECIFIED LUMP IN UNSPECIFIED BREAST: ICD-10-CM

## 2024-07-12 PROCEDURE — G0279: CPT | Mod: 26

## 2024-07-12 PROCEDURE — 77065 DX MAMMO INCL CAD UNI: CPT | Mod: 26,LT

## 2024-07-12 PROCEDURE — 76641 ULTRASOUND BREAST COMPLETE: CPT | Mod: 26,LT

## 2024-07-12 PROCEDURE — G0279: CPT

## 2024-07-12 PROCEDURE — 77065 DX MAMMO INCL CAD UNI: CPT

## 2024-07-12 PROCEDURE — 76641 ULTRASOUND BREAST COMPLETE: CPT

## 2024-07-15 ENCOUNTER — OUTPATIENT (OUTPATIENT)
Dept: OUTPATIENT SERVICES | Facility: HOSPITAL | Age: 44
LOS: 1 days | End: 2024-07-15
Payer: COMMERCIAL

## 2024-07-15 ENCOUNTER — APPOINTMENT (OUTPATIENT)
Dept: ULTRASOUND IMAGING | Facility: IMAGING CENTER | Age: 44
End: 2024-07-15
Payer: COMMERCIAL

## 2024-07-15 DIAGNOSIS — N63.20 UNSPECIFIED LUMP IN THE LEFT BREAST, UNSPECIFIED QUADRANT: ICD-10-CM

## 2024-07-15 DIAGNOSIS — Z00.8 ENCOUNTER FOR OTHER GENERAL EXAMINATION: ICD-10-CM

## 2024-07-15 DIAGNOSIS — Z98.891 HISTORY OF UTERINE SCAR FROM PREVIOUS SURGERY: Chronic | ICD-10-CM

## 2024-07-15 PROCEDURE — 77065 DX MAMMO INCL CAD UNI: CPT | Mod: 26,LT

## 2024-07-15 PROCEDURE — 19083 BX BREAST 1ST LESION US IMAG: CPT | Mod: LT

## 2024-07-15 PROCEDURE — 88305 TISSUE EXAM BY PATHOLOGIST: CPT

## 2024-07-15 PROCEDURE — 19083 BX BREAST 1ST LESION US IMAG: CPT

## 2024-07-15 PROCEDURE — 77065 DX MAMMO INCL CAD UNI: CPT

## 2024-07-15 PROCEDURE — A4648: CPT

## 2024-07-15 PROCEDURE — 88305 TISSUE EXAM BY PATHOLOGIST: CPT | Mod: 26

## 2024-09-30 NOTE — DISCHARGE NOTE PROVIDER - DISCHARGE SERVICE FOR PATIENT
on the discharge service for the patient. I have reviewed and made amendments to the documentation where necessary. holding onto walls/independent

## 2025-04-21 NOTE — ED PROVIDER NOTE - IV ALTEPLASE EXCL REL HIDDEN
Instructions: This plan will send the code FBSE to the PM system.  DO NOT or CHANGE the price. Price (Do Not Change): 0.00 Detail Level: Simple show

## 2025-04-26 ENCOUNTER — EMERGENCY (EMERGENCY)
Facility: HOSPITAL | Age: 45
LOS: 0 days | Discharge: HOME | End: 2025-04-26
Attending: EMERGENCY MEDICINE
Payer: COMMERCIAL

## 2025-04-26 VITALS
RESPIRATION RATE: 18 BRPM | SYSTOLIC BLOOD PRESSURE: 141 MMHG | HEART RATE: 87 BPM | OXYGEN SATURATION: 100 % | TEMPERATURE: 98 F | WEIGHT: 100.31 LBS | DIASTOLIC BLOOD PRESSURE: 97 MMHG

## 2025-04-26 VITALS — WEIGHT: 100.09 LBS | HEIGHT: 64 IN

## 2025-04-26 DIAGNOSIS — F32.A DEPRESSION, UNSPECIFIED: ICD-10-CM

## 2025-04-26 DIAGNOSIS — Z02.83 ENCOUNTER FOR BLOOD-ALCOHOL AND BLOOD-DRUG TEST: ICD-10-CM

## 2025-04-26 DIAGNOSIS — Z98.891 HISTORY OF UTERINE SCAR FROM PREVIOUS SURGERY: Chronic | ICD-10-CM

## 2025-04-26 LAB — ETHANOL SERPL-MCNC: <10 MG/DL — SIGNIFICANT CHANGE UP (ref 0–10)

## 2025-04-26 PROCEDURE — 99283 EMERGENCY DEPT VISIT LOW MDM: CPT

## 2025-04-26 PROCEDURE — 80307 DRUG TEST PRSMV CHEM ANLYZR: CPT

## 2025-04-26 PROCEDURE — 36415 COLL VENOUS BLD VENIPUNCTURE: CPT
